# Patient Record
Sex: FEMALE | Race: BLACK OR AFRICAN AMERICAN | NOT HISPANIC OR LATINO | Employment: UNEMPLOYED | URBAN - METROPOLITAN AREA
[De-identification: names, ages, dates, MRNs, and addresses within clinical notes are randomized per-mention and may not be internally consistent; named-entity substitution may affect disease eponyms.]

---

## 2017-04-28 ENCOUNTER — OFFICE VISIT (OUTPATIENT)
Dept: CARDIOLOGY | Facility: CLINIC | Age: 27
End: 2017-04-28

## 2017-04-28 VITALS
BODY MASS INDEX: 31.98 KG/M2 | HEART RATE: 67 BPM | WEIGHT: 199 LBS | DIASTOLIC BLOOD PRESSURE: 78 MMHG | OXYGEN SATURATION: 99 % | SYSTOLIC BLOOD PRESSURE: 128 MMHG | HEIGHT: 66 IN

## 2017-04-28 DIAGNOSIS — R06.02 SHORTNESS OF BREATH: ICD-10-CM

## 2017-04-28 DIAGNOSIS — R00.2 PALPITATIONS: Primary | ICD-10-CM

## 2017-04-28 DIAGNOSIS — I49.3 SYMPTOMATIC PVCS: ICD-10-CM

## 2017-04-28 DIAGNOSIS — R07.2 PRECORDIAL PAIN: ICD-10-CM

## 2017-04-28 PROCEDURE — 99204 OFFICE O/P NEW MOD 45 MIN: CPT | Performed by: INTERNAL MEDICINE

## 2017-04-28 NOTE — PROGRESS NOTES
Date of Office Visit: 2017  Encounter Provider: Shane Hensley MD  Place of Service: [facility] CARDIOLOGY  Patient Name: Nara Jacinto  :1990    Chief complaint: Palpitations, PVC's, SOA, chest pain.    History of Present Illness:    I had the pleasure of seeing your patient in cardiology office on 2017.  As you well   know, she is a very pleasant 27 year-old -American female with a past medical   history significant for hypothyroidism and obstructive sleep apnea who presents for   evaluation.  The patient states that she did have a murmur as a child, and saw Dr. Watts in pediatric cardiology many years ago.  She evidently had an echocardiogram   at one point which showed some regurgitation through one of the valves.  However,   she states that she was told that this had improved at her last visit with him in the   distant past.  Within the last several months, she has experienced significant   palpitations which she described as skipping beats.  She states that when these   occur, she often feels lightheaded, and feels as if she has to take a deep breath.    She also has had some sharp pain in the center of her chest which is fleeting, but   intense when it occurs.  She has also been more short of breath on exertion, and   states moderate activity will cause her to have to stop and rest.  Stairs do pose a   problem for her.  She does have hypothyroidism, and states that her thyroid has   recently been low.  Her Synthroid is currently being adjusted, and she is being   monitored every month with blood work.  She did have a Holter monitor at [facility] in [place], [place] recently.  The results of this are currently pending.    Past Medical History:   Diagnosis Date   • Hyperlipidemia    • Hypothyroidism    • DARIELA (obstructive sleep apnea)     Not on CPAP   • Symptomatic PVCs    • Vitamin D deficiency        Past Surgical History:  "  Procedure Laterality Date   • HERNIA REPAIR     • TONSILLECTOMY         Current Outpatient Prescriptions on File Prior to Visit   Medication Sig Dispense Refill   • atorvastatin (LIPITOR) 80 MG tablet Take 80 mg by mouth Daily.     • cetirizine (zyrTEC) 10 MG tablet Take 10 mg by mouth Daily.     • cholecalciferol (VITAMIN D3) 1000 UNITS tablet Take 1,000 Units by mouth Daily.     • levothyroxine (SYNTHROID, LEVOTHROID) 200 MCG tablet Take 200 mcg by mouth Daily.     • venlafaxine (EFFEXOR) 75 MG tablet Take 75 mg by mouth 2 (Two) Times a Day.       No current facility-administered medications on file prior to visit.      Allergies as of 04/28/2017   • (No Known Allergies)     Social History     Social History   • Marital status: Single     Spouse name: N/A   • Number of children: N/A   • Years of education: N/A     Occupational History   • Not on file.     Social History Main Topics   • Smoking status: Never Smoker   • Smokeless tobacco: Never Used   • Alcohol use No   • Drug use: No   • Sexual activity: Not on file     Other Topics Concern   • Not on file     Social History Narrative     Family History   Problem Relation Age of Onset   • Coronary artery disease Maternal Grandfather      MGF with 3 stents       Review of Systems   Cardiovascular: Positive for chest pain, dyspnea on exertion and palpitations.     Objective:     Vitals:    04/28/17 1000   BP: 128/78   Pulse: 67   SpO2: 99%   Weight: 199 lb (90.3 kg)   Height: 66\" (167.6 cm)     Body mass index is 32.12 kg/(m^2).    Physical Exam   Constitutional: She is oriented to person, place, and time. She appears well-developed and well-nourished.   HENT:   Head: Normocephalic and atraumatic.   Eyes: Conjunctivae are normal.   Neck: Neck supple.   Cardiovascular: Normal rate and regular rhythm.  Exam reveals no gallop and no friction rub.    No murmur heard.  Pulmonary/Chest: Effort normal and breath sounds normal.   Abdominal: Soft. There is no tenderness. "   Musculoskeletal: She exhibits no edema.   Neurological: She is alert and oriented to person, place, and time.   Skin: Skin is warm.   Psychiatric: She has a normal mood and affect. Her behavior is normal.     Lab Review:   Procedures      Assessment:       Diagnosis Plan   1. Palpitations  Adult Transthoracic Echo Complete   2. Symptomatic PVCs  Adult Transthoracic Echo Complete   3. Shortness of breath  Adult Transthoracic Echo Complete   4. Precordial pain  Adult Transthoracic Echo Complete     Plan:       As noted, the patient has been having palpitations, shortness of breath, and atypical   chest pain.  Her EKG from 4/10/2017 did show a PVC, and her symptoms are very   consistent with symptomatic PVCs.  I reassured her that these are fairly benign, but  can cause symptoms.  I am going to obtain the Holter monitor from [facility] in [place], [place], and I will review this.  I feel that she may need a beta   blocker given the significant symptoms that she is experiencing currently.  Also, her   thyroid function may be playing a role in this as she is currently having her Synthroid   adjusted for hypothyroidism.  This is monitored every month.  She did have a murmur   as a child, and was told she had regurgitation throughout valve is well.  I am going to   recheck an echocardiogram at this time.  Further plans will be made pending the   results of the Holter monitor and echo.  I will have her follow-up in one month unless   other issues arise.

## 2017-05-04 ENCOUNTER — TELEPHONE (OUTPATIENT)
Dept: CARDIOLOGY | Facility: CLINIC | Age: 27
End: 2017-05-04

## 2017-05-12 ENCOUNTER — HOSPITAL ENCOUNTER (OUTPATIENT)
Dept: CARDIOLOGY | Facility: HOSPITAL | Age: 27
Discharge: HOME | End: 2017-05-12
Attending: INTERNAL MEDICINE | Admitting: INTERNAL MEDICINE

## 2017-05-12 VITALS
BODY MASS INDEX: 31.98 KG/M2 | DIASTOLIC BLOOD PRESSURE: 66 MMHG | WEIGHT: 199 LBS | SYSTOLIC BLOOD PRESSURE: 110 MMHG | HEIGHT: 66 IN

## 2017-05-12 DIAGNOSIS — R06.02 SHORTNESS OF BREATH: ICD-10-CM

## 2017-05-12 DIAGNOSIS — I49.3 SYMPTOMATIC PVCS: ICD-10-CM

## 2017-05-12 DIAGNOSIS — R00.2 PALPITATIONS: ICD-10-CM

## 2017-05-12 DIAGNOSIS — R07.2 PRECORDIAL PAIN: ICD-10-CM

## 2017-05-12 LAB
ASCENDING AORTA: 2.6 CM
BH CV ECHO MEAS - ACS: 2.3 CM
BH CV ECHO MEAS - AO MAX PG (FULL): 4.8 MMHG
BH CV ECHO MEAS - AO MAX PG: 6.2 MMHG
BH CV ECHO MEAS - AO MEAN PG (FULL): 3 MMHG
BH CV ECHO MEAS - AO MEAN PG: 4 MMHG
BH CV ECHO MEAS - AO ROOT AREA (BSA CORRECTED): 1.3
BH CV ECHO MEAS - AO ROOT AREA: 5.3 CM^2
BH CV ECHO MEAS - AO ROOT DIAM: 2.6 CM
BH CV ECHO MEAS - AO V2 MAX: 124 CM/SEC
BH CV ECHO MEAS - AO V2 MEAN: 86.6 CM/SEC
BH CV ECHO MEAS - AO V2 VTI: 24.6 CM
BH CV ECHO MEAS - AVA(I,A): 1.4 CM^2
BH CV ECHO MEAS - AVA(I,D): 1.4 CM^2
BH CV ECHO MEAS - AVA(V,A): 1.5 CM^2
BH CV ECHO MEAS - AVA(V,D): 1.5 CM^2
BH CV ECHO MEAS - BSA(HAYCOCK): 2.1 M^2
BH CV ECHO MEAS - BSA: 2 M^2
BH CV ECHO MEAS - BZI_BMI: 32.1 KILOGRAMS/M^2
BH CV ECHO MEAS - BZI_METRIC_HEIGHT: 167.6 CM
BH CV ECHO MEAS - BZI_METRIC_WEIGHT: 90.3 KG
BH CV ECHO MEAS - CONTRAST EF (2CH): 50.5 ML/M^2
BH CV ECHO MEAS - CONTRAST EF 4CH: 51.4 ML/M^2
BH CV ECHO MEAS - EDV(MOD-SP2): 105 ML
BH CV ECHO MEAS - EDV(MOD-SP4): 111 ML
BH CV ECHO MEAS - EDV(TEICH): 171.2 ML
BH CV ECHO MEAS - EF(CUBED): 52.5 %
BH CV ECHO MEAS - EF(MOD-SP2): 50.5 %
BH CV ECHO MEAS - EF(MOD-SP4): 51.4 %
BH CV ECHO MEAS - EF(TEICH): 43.7 %
BH CV ECHO MEAS - ESV(MOD-SP2): 52 ML
BH CV ECHO MEAS - ESV(MOD-SP4): 54 ML
BH CV ECHO MEAS - ESV(TEICH): 96.3 ML
BH CV ECHO MEAS - FS: 22 %
BH CV ECHO MEAS - IVS/LVPW: 0.96
BH CV ECHO MEAS - IVSD: 0.88 CM
BH CV ECHO MEAS - LAT PEAK E' VEL: 12 CM/SEC
BH CV ECHO MEAS - LV DIASTOLIC VOL/BSA (35-75): 55.6 ML/M^2
BH CV ECHO MEAS - LV MASS(C)D: 207.3 GRAMS
BH CV ECHO MEAS - LV MASS(C)DI: 103.9 GRAMS/M^2
BH CV ECHO MEAS - LV MAX PG: 1.3 MMHG
BH CV ECHO MEAS - LV MEAN PG: 1 MMHG
BH CV ECHO MEAS - LV SYSTOLIC VOL/BSA (12-30): 27.1 ML/M^2
BH CV ECHO MEAS - LV V1 MAX: 57.9 CM/SEC
BH CV ECHO MEAS - LV V1 MEAN: 34.9 CM/SEC
BH CV ECHO MEAS - LV V1 VTI: 10.6 CM
BH CV ECHO MEAS - LVIDD: 5.9 CM
BH CV ECHO MEAS - LVIDS: 4.6 CM
BH CV ECHO MEAS - LVLD AP2: 6.6 CM
BH CV ECHO MEAS - LVLD AP4: 6.7 CM
BH CV ECHO MEAS - LVLS AP2: 5.6 CM
BH CV ECHO MEAS - LVLS AP4: 5.8 CM
BH CV ECHO MEAS - LVOT AREA (M): 3.1 CM^2
BH CV ECHO MEAS - LVOT AREA: 3.1 CM^2
BH CV ECHO MEAS - LVOT DIAM: 2 CM
BH CV ECHO MEAS - LVPWD: 0.92 CM
BH CV ECHO MEAS - MED PEAK E' VEL: 13 CM/SEC
BH CV ECHO MEAS - MR MAX PG: 54.5 MMHG
BH CV ECHO MEAS - MR MAX VEL: 369 CM/SEC
BH CV ECHO MEAS - MV A DUR: 0.11 SEC
BH CV ECHO MEAS - MV A MAX VEL: 34.1 CM/SEC
BH CV ECHO MEAS - MV DEC SLOPE: 398 CM/SEC^2
BH CV ECHO MEAS - MV DEC TIME: 0.16 SEC
BH CV ECHO MEAS - MV E MAX VEL: 73.1 CM/SEC
BH CV ECHO MEAS - MV E/A: 2.1
BH CV ECHO MEAS - MV MAX PG: 2.3 MMHG
BH CV ECHO MEAS - MV MEAN PG: 1 MMHG
BH CV ECHO MEAS - MV P1/2T MAX VEL: 74 CM/SEC
BH CV ECHO MEAS - MV P1/2T: 54.5 MSEC
BH CV ECHO MEAS - MV V2 MAX: 75.2 CM/SEC
BH CV ECHO MEAS - MV V2 MEAN: 49.4 CM/SEC
BH CV ECHO MEAS - MV V2 VTI: 25.7 CM
BH CV ECHO MEAS - MVA P1/2T LCG: 3 CM^2
BH CV ECHO MEAS - MVA(P1/2T): 4 CM^2
BH CV ECHO MEAS - MVA(VTI): 1.3 CM^2
BH CV ECHO MEAS - PA MAX PG (FULL): 3.2 MMHG
BH CV ECHO MEAS - PA MAX PG: 4.8 MMHG
BH CV ECHO MEAS - PA V2 MAX: 109 CM/SEC
BH CV ECHO MEAS - PULM A REVS DUR: 0.11 SEC
BH CV ECHO MEAS - PULM A REVS VEL: 26.4 CM/SEC
BH CV ECHO MEAS - PULM DIAS VEL: 46.8 CM/SEC
BH CV ECHO MEAS - PULM S/D: 1
BH CV ECHO MEAS - PULM SYS VEL: 47.2 CM/SEC
BH CV ECHO MEAS - PVA(V,A): 1.6 CM^2
BH CV ECHO MEAS - PVA(V,D): 1.6 CM^2
BH CV ECHO MEAS - QP/QS: 1.2
BH CV ECHO MEAS - RAP SYSTOLE: 3 MMHG
BH CV ECHO MEAS - RV MAX PG: 1.6 MMHG
BH CV ECHO MEAS - RV MEAN PG: 1 MMHG
BH CV ECHO MEAS - RV V1 MAX: 63.2 CM/SEC
BH CV ECHO MEAS - RV V1 MEAN: 44.1 CM/SEC
BH CV ECHO MEAS - RV V1 VTI: 14.6 CM
BH CV ECHO MEAS - RVOT AREA: 2.8 CM^2
BH CV ECHO MEAS - RVOT DIAM: 1.9 CM
BH CV ECHO MEAS - RVSP: 18 MMHG
BH CV ECHO MEAS - SI(AO): 65.4 ML/M^2
BH CV ECHO MEAS - SI(CUBED): 53.2 ML/M^2
BH CV ECHO MEAS - SI(LVOT): 16.7 ML/M^2
BH CV ECHO MEAS - SI(MOD-SP2): 26.6 ML/M^2
BH CV ECHO MEAS - SI(MOD-SP4): 28.6 ML/M^2
BH CV ECHO MEAS - SI(TEICH): 37.5 ML/M^2
BH CV ECHO MEAS - SUP REN AO DIAM: 1.5 CM
BH CV ECHO MEAS - SV(AO): 130.6 ML
BH CV ECHO MEAS - SV(CUBED): 106.2 ML
BH CV ECHO MEAS - SV(LVOT): 33.3 ML
BH CV ECHO MEAS - SV(MOD-SP2): 53 ML
BH CV ECHO MEAS - SV(MOD-SP4): 57 ML
BH CV ECHO MEAS - SV(RVOT): 41.4 ML
BH CV ECHO MEAS - SV(TEICH): 74.9 ML
BH CV ECHO MEAS - TAPSE (>1.6): 1.8 CM2
BH CV ECHO MEAS - TR MAX VEL: 194 CM/SEC
BH CV XLRA - RV BASE: 3.2 CM
BH CV XLRA - TDI S': 12 CM/SEC
E/E' RATIO: 6
LEFT ATRIUM VOLUME INDEX: 20 ML/M2
LV EF 2D ECHO EST: 51 %
SINUS: 2.8 CM
STJ: 2.3 CM

## 2017-05-12 PROCEDURE — 93306 TTE W/DOPPLER COMPLETE: CPT | Performed by: INTERNAL MEDICINE

## 2017-05-12 PROCEDURE — 25010000002 PERFLUTREN (DEFINITY) 8.476 MG IN SODIUM CHLORIDE 10 ML INJECTION: Performed by: INTERNAL MEDICINE

## 2017-05-12 PROCEDURE — C8929 TTE W OR WO FOL WCON,DOPPLER: HCPCS

## 2017-05-12 RX ADMIN — PERFLUTREN 1.5 ML: 6.52 INJECTION, SUSPENSION INTRAVENOUS at 11:33

## 2017-06-01 ENCOUNTER — LAB (OUTPATIENT)
Dept: LAB | Facility: HOSPITAL | Age: 27
End: 2017-06-01

## 2017-06-01 ENCOUNTER — OFFICE VISIT (OUTPATIENT)
Dept: CARDIOLOGY | Facility: CLINIC | Age: 27
End: 2017-06-01

## 2017-06-01 VITALS
HEART RATE: 96 BPM | WEIGHT: 200 LBS | OXYGEN SATURATION: 99 % | DIASTOLIC BLOOD PRESSURE: 62 MMHG | SYSTOLIC BLOOD PRESSURE: 102 MMHG | BODY MASS INDEX: 32.14 KG/M2 | HEIGHT: 66 IN

## 2017-06-01 DIAGNOSIS — R00.2 PALPITATIONS: ICD-10-CM

## 2017-06-01 DIAGNOSIS — I49.1 PREMATURE ATRIAL CONTRACTIONS: Primary | ICD-10-CM

## 2017-06-01 DIAGNOSIS — I49.1 PREMATURE ATRIAL CONTRACTIONS: ICD-10-CM

## 2017-06-01 LAB
T-UPTAKE NFR SERPL: 1.29 TBI (ref 0.8–1.3)
T4 SERPL-MCNC: 2.82 MCG/DL (ref 4.5–11.7)
TSH SERPL DL<=0.05 MIU/L-ACNC: 44.9 MIU/ML (ref 0.27–4.2)

## 2017-06-01 PROCEDURE — 84443 ASSAY THYROID STIM HORMONE: CPT

## 2017-06-01 PROCEDURE — 36415 COLL VENOUS BLD VENIPUNCTURE: CPT

## 2017-06-01 PROCEDURE — 84479 ASSAY OF THYROID (T3 OR T4): CPT

## 2017-06-01 PROCEDURE — 99214 OFFICE O/P EST MOD 30 MIN: CPT | Performed by: INTERNAL MEDICINE

## 2017-06-01 PROCEDURE — 84436 ASSAY OF TOTAL THYROXINE: CPT

## 2017-06-07 NOTE — PROGRESS NOTES
Date of Office Visit: 2017  Encounter Provider: Shane Hensley MD  Place of Service: [facility] CARDIOLOGY  Patient Name: Nara Jacinto  :1990    Chief complaint: Follow-up for palpitations and PACs    History of Present Illness:    I again had the pleasure of seeing the patient in cardiology office on 2017.  She is a   very pleasant 27 year-old -American female with a past medical history significant   for hypothyroidism, PACs, and obstructive sleep apnea who presents for follow-up.  The   patient initially saw me on 2017.  She explained that she had a murmur as a child,   and had seen Dr. Watts in pediatric cardiology many years ago.  However, several   months prior to visiting me, she had begun to experience significant palpitations which   she described as skipping beats.  When these occurred, she felt lightheaded, and felt as   if she had to take a deep breath.  She had also been more short of breath with exertion.    She had worn a Holter monitor at [facility] [place] [facility] in [place], [place], on 2017.    She had approximately 7000 PACs noted at that time, but no SVT.  She had only 487   PVCs.  A subsequent echocardiogram was checked in the office on 2017 which   showed an ejection fraction of 51% and mild mitral regurgitation.    The patient presents today for follow-up.  Overall, she feels poorly.  She states that she   is more fatigue, and has started wheezing at night.  She still feels sluggish and short of   breath with exertion.  She is also still feeling the palpitations.    Past Medical History:   Diagnosis Date   • Hyperlipidemia    • Hypothyroidism    • DARIELA (obstructive sleep apnea)     Not on CPAP   • Symptomatic PVCs    • Vitamin D deficiency        Past Surgical History:   Procedure Laterality Date   • HERNIA REPAIR     • TONSILLECTOMY         Current Outpatient Prescriptions on File Prior to Visit   Medication Sig Dispense  "Refill   • atorvastatin (LIPITOR) 80 MG tablet Take 80 mg by mouth Daily.     • cetirizine (zyrTEC) 10 MG tablet Take 10 mg by mouth Daily.     • cholecalciferol (VITAMIN D3) 1000 UNITS tablet Take 1,000 Units by mouth Daily.     • levothyroxine (SYNTHROID, LEVOTHROID) 200 MCG tablet Take 200 mcg by mouth Daily.     • venlafaxine (EFFEXOR) 75 MG tablet Take 75 mg by mouth 2 (Two) Times a Day.       No current facility-administered medications on file prior to visit.      Allergies as of 06/01/2017   • (No Known Allergies)     Social History     Social History   • Marital status: Single     Spouse name: N/A   • Number of children: N/A   • Years of education: N/A     Occupational History   • Not on file.     Social History Main Topics   • Smoking status: Never Smoker   • Smokeless tobacco: Never Used   • Alcohol use No   • Drug use: No   • Sexual activity: Not on file     Other Topics Concern   • Not on file     Social History Narrative     Family History   Problem Relation Age of Onset   • Coronary artery disease Maternal Grandfather      MGF with 3 stents       Review of Systems   Constitution: Positive for weight gain.   HENT: Positive for sore throat.    Cardiovascular: Positive for dyspnea on exertion.   Respiratory: Positive for snoring and wheezing.    Psychiatric/Behavioral: Positive for depression. The patient is nervous/anxious.    All other systems reviewed and are negative.    Objective:     Vitals:    06/01/17 1402   BP: 102/62   Pulse: 96   SpO2: 99%   Weight: 200 lb (90.7 kg)   Height: 66\" (167.6 cm)     Body mass index is 32.28 kg/(m^2).    Physical Exam   Constitutional: She is oriented to person, place, and time. She appears well-developed and well-nourished.   HENT:   Head: Normocephalic and atraumatic.   Eyes: Conjunctivae are normal.   Neck: Neck supple.   Cardiovascular: Normal rate and regular rhythm.  Exam reveals no gallop and no friction rub.    No murmur heard.  Pulmonary/Chest: Effort " normal and breath sounds normal.   Abdominal: Soft. There is no tenderness.   Musculoskeletal: She exhibits no edema.   Neurological: She is alert and oriented to person, place, and time.   Skin: Skin is warm.   Psychiatric: She has a normal mood and affect. Her behavior is normal.     Lab Review:   Procedures    Cardiac Procedures:  1.  Holter monitor on 4/6/2017 at [facility] in [place], [place]: There were   approximately 7000 premature atrial contractions.  There were 487 PVCs.  There was   no SVT or arrhythmias.  2.  Echocardiogram on 5/12/2017: The ejection fraction was 51%.  There was mild   mitral regurgitation.    Assessment:       Diagnosis Plan   1. Premature atrial contractions  Thyroid Panel With TSH   2. Palpitations  Thyroid Panel With TSH     Plan:       I discussed the Holter monitor in detail with the patient.  I had a hard time tracking this   study down, and I have not been able to talk to her yet about it.  I explained to her that   the premature atrial contractions are very likely the cause of her symptoms, and   because she is still having a significant amount with symptoms, I do feel that trying   her on a low-dose medication may be indicated.  She has been wheezing at night,   and I am going to avoid beta blockers for this reason.  I am going to try her on diltiazem   at 30 mg twice a day to see if this helps her symptoms.  She also has multiple other   symptoms, and feels poorly in general.  She is on thyroid replacement therapy, but   has not had her thyroid function tests checked recently.  I am going to check a TSH   and free T4 at this point to see if this may be playing a role in her overall clinical   picture.  I will have her follow-up with me in 2 months, and she will call if other issues   arise.

## 2017-07-12 ENCOUNTER — TRANSCRIBE ORDERS (OUTPATIENT)
Dept: ADMINISTRATIVE | Facility: HOSPITAL | Age: 27
End: 2017-07-12

## 2017-07-12 DIAGNOSIS — J30.2 OTHER SEASONAL ALLERGIC RHINITIS: Primary | ICD-10-CM

## 2017-07-25 ENCOUNTER — OFFICE VISIT (OUTPATIENT)
Dept: ENDOCRINOLOGY | Age: 27
End: 2017-07-25

## 2017-07-25 VITALS
BODY MASS INDEX: 32.02 KG/M2 | WEIGHT: 199.2 LBS | SYSTOLIC BLOOD PRESSURE: 128 MMHG | RESPIRATION RATE: 16 BRPM | HEIGHT: 66 IN | DIASTOLIC BLOOD PRESSURE: 82 MMHG

## 2017-07-25 DIAGNOSIS — E55.9 VITAMIN D DEFICIENCY: ICD-10-CM

## 2017-07-25 DIAGNOSIS — E89.0 POSTABLATIVE HYPOTHYROIDISM: Primary | ICD-10-CM

## 2017-07-25 DIAGNOSIS — E05.00 GRAVES' DISEASE: ICD-10-CM

## 2017-07-25 DIAGNOSIS — I10 ESSENTIAL HYPERTENSION: ICD-10-CM

## 2017-07-25 DIAGNOSIS — E78.5 DYSLIPIDEMIA: ICD-10-CM

## 2017-07-25 PROCEDURE — 99204 OFFICE O/P NEW MOD 45 MIN: CPT | Performed by: INTERNAL MEDICINE

## 2017-07-25 RX ORDER — LEVOTHYROXINE SODIUM 300 UG/1
300 TABLET ORAL DAILY
Qty: 30 TABLET | Refills: 5 | Status: SHIPPED | OUTPATIENT
Start: 2017-07-25 | End: 2017-08-03

## 2017-07-25 NOTE — PROGRESS NOTES
"Subjective   Nara Jacinto is a 27 y.o. female seen as a new patient for hypothyroidism, vit d deficiency, thyroid nodule. Patient denies any problems or concerns. She had thyroid US 04/16/2017 and states that she also had an ablation years ago.     History of Present Illness is a 27-year-old female known patient with history of Graves' disease at age 13.  She was subsequently treated with the radio active iodine ablation and became hypothyroid.  She has been on the variety of doses of parathyroid hormone medication.  On 07/01/2017 she had a thyroid function test that showed her TSH level was 44.9 on 200 µg of the levothyroxin. Since then she has been increased to 250 µg daily.  She still feels very tired.  Also she is intrauterinebirth control for the past 4+ years however recently she had a very painful crampy menstrual bleeding.  /82  Resp 16  Ht 66\" (167.6 cm)  Wt 199 lb 3.2 oz (90.4 kg)  BMI 32.15 kg/m2  No Known Allergies  .  Current Outpatient Prescriptions:   •  atorvastatin (LIPITOR) 80 MG tablet, Take 80 mg by mouth Daily., Disp: , Rfl:   •  cetirizine (zyrTEC) 10 MG tablet, Take 10 mg by mouth Daily., Disp: , Rfl:   •  cholecalciferol (VITAMIN D3) 1000 UNITS tablet, Take 1,000 Units by mouth Daily., Disp: , Rfl:   •  diltiaZEM (CARDIZEM) 30 MG tablet, Take 1 tablet by mouth 2 (Two) Times a Day., Disp: 60 tablet, Rfl: 11  •  levothyroxine (SYNTHROID, LEVOTHROID) 200 MCG tablet, Take 200 mcg by mouth Daily., Disp: , Rfl:   •  venlafaxine (EFFEXOR) 75 MG tablet, Take 75 mg by mouth 2 (Two) Times a Day., Disp: , Rfl:     The following portions of the patient's history were reviewed and updated as appropriate: allergies, current medications, past family history, past medical history, past social history, past surgical history and problem list.    Review of Systems   Constitutional: Negative.    HENT: Negative.    Eyes: Negative.    Respiratory: Negative.    Cardiovascular: Negative.  "   Gastrointestinal: Negative.    Endocrine: Negative.    Genitourinary: Negative.    Musculoskeletal: Negative.    Skin: Negative.    Allergic/Immunologic: Negative.    Neurological: Negative.    Hematological: Negative.    Psychiatric/Behavioral: Negative.        Objective   Physical Exam   Constitutional: She is oriented to person, place, and time. She appears well-developed and well-nourished. No distress.   HENT:   Head: Normocephalic and atraumatic.   Right Ear: External ear normal.   Left Ear: External ear normal.   Nose: Nose normal.   Mouth/Throat: Oropharynx is clear and moist. No oropharyngeal exudate.   Eyes: Conjunctivae and EOM are normal. Pupils are equal, round, and reactive to light. Right eye exhibits no discharge. Left eye exhibits no discharge. No scleral icterus.   Neck: Normal range of motion. Neck supple. No JVD present. No tracheal deviation present. No thyromegaly present.   Cardiovascular: Normal rate, regular rhythm, normal heart sounds and intact distal pulses.  Exam reveals no gallop and no friction rub.    No murmur heard.  Pulmonary/Chest: Effort normal and breath sounds normal. No stridor. No respiratory distress. She has no wheezes. She has no rales. She exhibits no tenderness.   Abdominal: Soft. Bowel sounds are normal. She exhibits no distension and no mass. There is no tenderness. There is no rebound and no guarding. No hernia.   Musculoskeletal: Normal range of motion. She exhibits no edema, tenderness or deformity.   Lymphadenopathy:     She has no cervical adenopathy.   Neurological: She is alert and oriented to person, place, and time. She has normal reflexes. She displays normal reflexes. No cranial nerve deficit. She exhibits normal muscle tone. Coordination normal.   Skin: Skin is warm and dry. No rash noted. She is not diaphoretic. No erythema. No pallor.   Psychiatric: She has a normal mood and affect. Her behavior is normal. Judgment and thought content normal.   Nursing  note and vitals reviewed.        Assessment/Plan   Diagnoses and all orders for this visit:    Postablative hypothyroidism  -     T3, Free  -     T4 & TSH ([facility])  -     T4, Free  -     Thyroglobulin With Anti-TG  -     Uric Acid  -     Vitamin D 25 Hydroxy  -     Comprehensive Metabolic Panel  -     C-Peptide  -     Follicle Stimulating Hormone  -     Hemoglobin A1c  -     Lipid Panel  -     Luteinizing Hormone  -     Prolactin  -     Thyroid Stimulating Immunoglobulin  -     ACTH  -     Cortisol    Graves' disease  -     T3, Free  -     T4 & TSH ([facility])  -     T4, Free  -     Thyroglobulin With Anti-TG  -     Uric Acid  -     Vitamin D 25 Hydroxy  -     Comprehensive Metabolic Panel  -     C-Peptide  -     Follicle Stimulating Hormone  -     Hemoglobin A1c  -     Lipid Panel  -     Luteinizing Hormone  -     Prolactin  -     Thyroid Stimulating Immunoglobulin  -     ACTH  -     Cortisol    Dyslipidemia  -     T3, Free  -     T4 & TSH ([facility])  -     T4, Free  -     Thyroglobulin With Anti-TG  -     Uric Acid  -     Vitamin D 25 Hydroxy  -     Comprehensive Metabolic Panel  -     C-Peptide  -     Follicle Stimulating Hormone  -     Hemoglobin A1c  -     Lipid Panel  -     Luteinizing Hormone  -     Prolactin  -     Thyroid Stimulating Immunoglobulin  -     ACTH  -     Cortisol    Essential hypertension  -     T3, Free  -     T4 & TSH ([facility])  -     T4, Free  -     Thyroglobulin With Anti-TG  -     Uric Acid  -     Vitamin D 25 Hydroxy  -     Comprehensive Metabolic Panel  -     C-Peptide  -     Follicle Stimulating Hormone  -     Hemoglobin A1c  -     Lipid Panel  -     Luteinizing Hormone  -     Prolactin  -     Thyroid Stimulating Immunoglobulin  -     ACTH  -     Cortisol    Vitamin D deficiency  -     T3, Free  -     T4 & TSH ([facility])  -     T4, Free  -     Thyroglobulin With Anti-TG  -     Uric Acid  -     Vitamin D 25 Hydroxy  -     Comprehensive Metabolic Panel  -     C-Peptide  -     Follicle  Stimulating Hormone  -     Hemoglobin A1c  -     Lipid Panel  -     Luteinizing Hormone  -     Prolactin  -     Thyroid Stimulating Immunoglobulin  -     ACTH  -     Cortisol    Other orders  -     UNITHROID 300 MCG tablet; Take 1 tablet by mouth Daily.               His summary I saw and examined this 27-year-old female for above-mentioned problems.  I reviewed her laboratory evaluation as well as office notes from her primary care provider and cardiologist and at this point we will go ahead and order an extensive laboratory evaluation and once the results come back we will go ahead and inform her of any possible modifications.  No wheezing that on 06/01/2017 her TSH was almost 45 on 200 µg of levothyroxin daily I am going to go ahead and increased her dose to 300 µg daily.  She will see Ms. Alanna Bennett in 3 months or sooner if needed with laboratory evaluation prior to each office visit.

## 2017-08-03 LAB
25(OH)D3+25(OH)D2 SERPL-MCNC: 19.1 NG/ML (ref 30–100)
ACTH PLAS-MCNC: 16.3 PG/ML (ref 7.2–63.3)
ALBUMIN SERPL-MCNC: 4.7 G/DL (ref 3.5–5.2)
ALBUMIN/GLOB SERPL: 1.5 G/DL
ALP SERPL-CCNC: 74 U/L (ref 39–117)
ALT SERPL-CCNC: 26 U/L (ref 1–33)
AST SERPL-CCNC: 20 U/L (ref 1–32)
BILIRUB SERPL-MCNC: 0.6 MG/DL (ref 0.1–1.2)
BUN SERPL-MCNC: 8 MG/DL (ref 6–20)
BUN/CREAT SERPL: 6.8 (ref 7–25)
C PEPTIDE SERPL-MCNC: 2.6 NG/ML (ref 1.1–4.4)
CALCIUM SERPL-MCNC: 9.7 MG/DL (ref 8.6–10.5)
CHLORIDE SERPL-SCNC: 102 MMOL/L (ref 98–107)
CHOLEST SERPL-MCNC: 232 MG/DL (ref 0–200)
CO2 SERPL-SCNC: 24 MMOL/L (ref 22–29)
CORTIS SERPL-MCNC: 4.3 UG/DL
CREAT SERPL-MCNC: 1.18 MG/DL (ref 0.57–1)
FSH SERPL-ACNC: 5.8 MIU/ML
GLOBULIN SER CALC-MCNC: 3.2 GM/DL
GLUCOSE SERPL-MCNC: 87 MG/DL (ref 65–99)
HBA1C MFR BLD: 5.6 % (ref 4.8–5.6)
HDLC SERPL-MCNC: 36 MG/DL (ref 40–60)
LDLC SERPL CALC-MCNC: 167 MG/DL (ref 0–100)
LH SERPL-ACNC: 7.8 MIU/ML
POTASSIUM SERPL-SCNC: 4.4 MMOL/L (ref 3.5–5.2)
PROLACTIN SERPL-MCNC: 16.8 NG/ML (ref 4.8–23.3)
PROT SERPL-MCNC: 7.9 G/DL (ref 6–8.5)
SODIUM SERPL-SCNC: 139 MMOL/L (ref 136–145)
T3FREE SERPL-MCNC: 2 PG/ML (ref 2–4.4)
T4 FREE SERPL-MCNC: 0.65 NG/DL (ref 0.93–1.7)
T4 SERPL-MCNC: 4.24 MCG/DL (ref 4.5–11.7)
THYROGLOB AB SERPL-ACNC: <1 IU/ML (ref 0–0.9)
THYROGLOB SERPL-MCNC: 9.5 NG/ML (ref 1.5–38.5)
TRIGL SERPL-MCNC: 143 MG/DL (ref 0–150)
TSH SERPL DL<=0.005 MIU/L-ACNC: 40.17 MIU/ML (ref 0.27–4.2)
TSI ACT/NOR SER: 90 % (ref 0–139)
URATE SERPL-MCNC: 5 MG/DL (ref 2.4–5.7)
VLDLC SERPL CALC-MCNC: 28.6 MG/DL (ref 5–40)

## 2017-08-03 RX ORDER — LEVOTHYROXINE SODIUM 200 UG/1
400 TABLET ORAL DAILY
Qty: 60 TABLET | Refills: 5 | Status: SHIPPED | OUTPATIENT
Start: 2017-08-03 | End: 2017-08-04 | Stop reason: SDUPTHER

## 2017-08-03 RX ORDER — ERGOCALCIFEROL 1.25 MG/1
50000 CAPSULE ORAL 2 TIMES WEEKLY
Qty: 26 CAPSULE | Refills: 3 | Status: SHIPPED | OUTPATIENT
Start: 2017-08-03 | End: 2017-08-04 | Stop reason: SDUPTHER

## 2017-08-04 RX ORDER — ERGOCALCIFEROL 1.25 MG/1
50000 CAPSULE ORAL 2 TIMES WEEKLY
Qty: 26 CAPSULE | Refills: 3 | Status: SHIPPED | OUTPATIENT
Start: 2017-08-07 | End: 2018-08-07

## 2017-08-04 RX ORDER — LEVOTHYROXINE SODIUM 200 UG/1
400 TABLET ORAL DAILY
Qty: 180 TABLET | Refills: 3 | Status: SHIPPED | OUTPATIENT
Start: 2017-08-04 | End: 2020-05-15

## 2017-09-06 ENCOUNTER — OFFICE VISIT (OUTPATIENT)
Dept: CARDIOLOGY | Facility: CLINIC | Age: 27
End: 2017-09-06

## 2017-09-06 VITALS
SYSTOLIC BLOOD PRESSURE: 110 MMHG | WEIGHT: 205 LBS | OXYGEN SATURATION: 98 % | BODY MASS INDEX: 32.95 KG/M2 | HEART RATE: 88 BPM | DIASTOLIC BLOOD PRESSURE: 62 MMHG | HEIGHT: 66 IN

## 2017-09-06 DIAGNOSIS — I49.1 PREMATURE ATRIAL CONTRACTIONS: Primary | ICD-10-CM

## 2017-09-06 DIAGNOSIS — R00.2 PALPITATIONS: ICD-10-CM

## 2017-09-06 PROCEDURE — 99213 OFFICE O/P EST LOW 20 MIN: CPT | Performed by: INTERNAL MEDICINE

## 2017-09-06 NOTE — PROGRESS NOTES
Date of Office Visit: 2017  Encounter Provider: Shane Hensley MD  Place of Service: [facility] CARDIOLOGY  Patient Name: Nara Jacinto  :1990     Chief complaint: Follow-up for palpitations and PACs.    History of Present Illness:    I again had the pleasure of seeing the patient in cardiology office on 2017. She   is a very pleasant 27 year-old -American female with a past medical history   significant for hypothyroidism, PACs, and obstructive sleep apnea who presents   for follow-up.  The patient initially saw me on 2017.  She explained that she   had a murmur as a child, and had seen Dr. Watts in pediatric cardiology many   years ago.  However, several months prior to visiting me, she had begun to   experience significant palpitations which she described as skipping beats. When   these occurred, she felt lightheaded, and felt as if she had to take a deep breath.    She had also been more short of breath with exertion.  She had worn a Holter   monitor at [facility] in [place], [place], on 2017.  She had   approximately 7000 PACs noted at that time, but no SVT.  She had only 487   PVCs. A subsequent echocardiogram was checked in the office on 2017   which showed an ejection fraction of 51% and mild mitral regurgitation.  She did   have her thyroid function tests checked, and was found to be significantly   hypothyroid with a TSH of 44.9 on 2017.  She subsequently was referred to   Dr. Villafuerte, and was started on thyroid repletion.     The patient presents today for follow-up.  She is doing well.  In fact, she states   that the palpitations have improved significantly.  She only has occasional bursts   of palpitations, but much less frequently than before.  She has not had any   chest pain or shortness of breath.  She does feel better since her thyroid has   been treated.    Past Medical History:   Diagnosis Date   •  Hyperlipidemia    • Hypothyroidism    • DARIELA (obstructive sleep apnea)     Not on CPAP   • Premature atrial contractions     7000 PAC's by Holter on 4/6/17   • PVC's (premature ventricular contractions)    • Thyroid nodule    • Vitamin D deficiency        Past Surgical History:   Procedure Laterality Date   • HERNIA REPAIR     • TONSILLECTOMY         Current Outpatient Prescriptions on File Prior to Visit   Medication Sig Dispense Refill   • atorvastatin (LIPITOR) 80 MG tablet Take 80 mg by mouth Daily.     • cetirizine (zyrTEC) 10 MG tablet Take 10 mg by mouth Daily.     • cholecalciferol (VITAMIN D3) 1000 UNITS tablet Take 1,000 Units by mouth Daily.     • diltiaZEM (CARDIZEM) 30 MG tablet Take 1 tablet by mouth 2 (Two) Times a Day. 60 tablet 11   • ergocalciferol (DRISDOL) 84439 UNITS capsule Take 1 capsule by mouth 2 (Two) Times a Week. 26 capsule 3   • UNITHROID 200 MCG tablet Take 2 tablets by mouth Daily. 180 tablet 3   • venlafaxine (EFFEXOR) 75 MG tablet Take 75 mg by mouth 2 (Two) Times a Day.       No current facility-administered medications on file prior to visit.      Allergies as of 09/06/2017   • (No Known Allergies)     Social History     Social History   • Marital status: Single     Spouse name: N/A   • Number of children: N/A   • Years of education: N/A     Occupational History   • Not on file.     Social History Main Topics   • Smoking status: Never Smoker   • Smokeless tobacco: Never Used   • Alcohol use No   • Drug use: No   • Sexual activity: Not on file     Other Topics Concern   • Not on file     Social History Narrative     Family History   Problem Relation Age of Onset   • Coronary artery disease Maternal Grandfather      MGF with 3 stents       Review of Systems   Constitution: Positive for weight gain.   Cardiovascular: Positive for palpitations.   Musculoskeletal: Positive for joint swelling.   Neurological: Positive for numbness.   Psychiatric/Behavioral: The patient is nervous/anxious.  "   All other systems reviewed and are negative.    Objective:     Vitals:    09/06/17 1427   BP: 110/62   Pulse: 88   SpO2: 98%   Weight: 205 lb (93 kg)   Height: 66\" (167.6 cm)     Body mass index is 33.09 kg/(m^2).    Physical Exam   Constitutional: She is oriented to person, place, and time. She appears well-developed and well-nourished.   HENT:   Head: Normocephalic and atraumatic.   Eyes: Conjunctivae are normal.   Neck: Neck supple.   Cardiovascular: Normal rate and regular rhythm.  Exam reveals no gallop and no friction rub.    No murmur heard.  Pulmonary/Chest: Effort normal and breath sounds normal.   Abdominal: Soft. There is no tenderness.   Musculoskeletal: She exhibits no edema.   Neurological: She is alert and oriented to person, place, and time.   Skin: Skin is warm.   Psychiatric: She has a normal mood and affect. Her behavior is normal.     Lab Review:   Procedures    Cardiac Procedures:  1.  Holter monitor on 4/6/2017 at [facility] in [place], [place]: There were   approximately 7000 premature atrial contractions.  There were 487 PVCs.  There was   no SVT or arrhythmias.  2.  Echocardiogram on 5/12/2017: The ejection fraction was 51%.  There was mild   mitral regurgitation.    Assessment:       Diagnosis Plan   1. Premature atrial contractions     2. Palpitations       Plan:       The patient seems to be doing very well at this point.  I strongly suspect that her   hypothyroidism was driving her premature atrial contractions to be much more   frequent.  She has had only rare palpitations recently, and feels much better in   general since her thyroid has been treated.  She is going to follow-up with Dr. Villafuerte for this.  I feel that at this point she could come off of the diltiazem.  She  is on a very minimal dose.  She will start taking 30 mg once a day over the next   several days, and then quit.  If the palpitations recur, this can always be restarted.    Otherwise, I will plan on " following her on a yearly basis.

## 2018-08-30 ENCOUNTER — TRANSCRIBE ORDERS (OUTPATIENT)
Dept: ADMINISTRATIVE | Facility: HOSPITAL | Age: 28
End: 2018-08-30

## 2018-08-30 DIAGNOSIS — N62 HYPERTROPHY OF BREAST: ICD-10-CM

## 2018-08-30 DIAGNOSIS — N62 LARGE BREASTS: Primary | ICD-10-CM

## 2018-09-11 ENCOUNTER — APPOINTMENT (OUTPATIENT)
Dept: ULTRASOUND IMAGING | Facility: HOSPITAL | Age: 28
End: 2018-09-11

## 2018-09-11 ENCOUNTER — HOSPITAL ENCOUNTER (OUTPATIENT)
Dept: MAMMOGRAPHY | Facility: HOSPITAL | Age: 28
Discharge: HOME | End: 2018-09-11
Admitting: NURSE PRACTITIONER

## 2018-09-11 DIAGNOSIS — N62 HYPERTROPHY OF BREAST: ICD-10-CM

## 2018-09-11 PROCEDURE — 77066 DX MAMMO INCL CAD BI: CPT

## 2019-11-25 ENCOUNTER — OFFICE VISIT (OUTPATIENT)
Dept: OBSTETRICS AND GYNECOLOGY | Age: 29
End: 2019-11-25

## 2019-11-25 VITALS
HEIGHT: 66 IN | WEIGHT: 201 LBS | DIASTOLIC BLOOD PRESSURE: 78 MMHG | BODY MASS INDEX: 32.3 KG/M2 | SYSTOLIC BLOOD PRESSURE: 120 MMHG

## 2019-11-25 DIAGNOSIS — Z01.419 WELL WOMAN EXAM WITH ROUTINE GYNECOLOGICAL EXAM: Primary | ICD-10-CM

## 2019-11-25 PROBLEM — L73.2 HYDRADENITIS: Status: ACTIVE | Noted: 2019-11-25

## 2019-11-25 PROCEDURE — 99385 PREV VISIT NEW AGE 18-39: CPT | Performed by: NURSE PRACTITIONER

## 2019-11-25 RX ORDER — CEPHALEXIN 500 MG/1
CAPSULE ORAL
COMMUNITY
Start: 2019-11-15 | End: 2020-05-15

## 2019-11-25 RX ORDER — IBUPROFEN 800 MG/1
TABLET ORAL
COMMUNITY
Start: 2019-11-15

## 2019-11-25 RX ORDER — GABAPENTIN 300 MG/1
CAPSULE ORAL
COMMUNITY
End: 2020-05-15

## 2019-11-25 RX ORDER — PROMETHAZINE HYDROCHLORIDE 25 MG/1
TABLET ORAL
COMMUNITY
End: 2020-05-15

## 2019-11-25 RX ORDER — VENLAFAXINE HYDROCHLORIDE 150 MG/1
CAPSULE, EXTENDED RELEASE ORAL
COMMUNITY
Start: 2019-11-12 | End: 2020-05-15

## 2019-11-25 RX ORDER — DOXYCYCLINE HYCLATE 100 MG/1
CAPSULE ORAL
COMMUNITY
End: 2020-05-15

## 2019-11-25 RX ORDER — UBIDECARENONE 100 MG
CAPSULE ORAL
COMMUNITY
Start: 2019-11-12

## 2019-11-25 RX ORDER — METHOCARBAMOL 750 MG/1
TABLET, FILM COATED ORAL
COMMUNITY

## 2019-11-25 RX ORDER — PYRAZINAMIDE 500 MG/1
TABLET ORAL
COMMUNITY
End: 2020-05-15

## 2019-11-25 RX ORDER — LEVOTHYROXINE SODIUM 175 UG/1
TABLET ORAL
COMMUNITY
Start: 2019-11-12

## 2019-11-25 RX ORDER — ACETAMINOPHEN 650 MG/1
TABLET, FILM COATED, EXTENDED RELEASE ORAL
COMMUNITY
Start: 2019-11-13

## 2019-11-25 RX ORDER — LORATADINE 10 MG/1
TABLET ORAL
COMMUNITY
Start: 2019-11-12 | End: 2020-05-15

## 2019-11-25 RX ORDER — HYDROCODONE BITARTRATE AND ACETAMINOPHEN 7.5; 325 MG/1; MG/1
TABLET ORAL
COMMUNITY
End: 2020-05-15

## 2019-11-25 RX ORDER — ERGOCALCIFEROL 1.25 MG/1
CAPSULE ORAL
COMMUNITY

## 2019-11-25 RX ORDER — DICYCLOMINE HCL 20 MG
TABLET ORAL
COMMUNITY

## 2019-11-25 RX ORDER — METRONIDAZOLE 500 MG/1
TABLET ORAL
COMMUNITY
End: 2020-05-15

## 2019-11-25 RX ORDER — METHYLPREDNISOLONE 4 MG/1
TABLET ORAL
COMMUNITY
End: 2020-05-15

## 2019-11-27 LAB
C TRACH RRNA SPEC QL NAA+PROBE: NEGATIVE
N GONORRHOEA RRNA SPEC QL NAA+PROBE: NEGATIVE
T VAGINALIS DNA SPEC QL NAA+PROBE: NEGATIVE

## 2019-11-29 LAB
CONV .: NORMAL
CYTOLOGIST CVX/VAG CYTO: NORMAL
CYTOLOGY CVX/VAG DOC CYTO: NORMAL
CYTOLOGY CVX/VAG DOC THIN PREP: NORMAL
DX ICD CODE: NORMAL
HIV 1 & 2 AB SER-IMP: NORMAL
OTHER STN SPEC: NORMAL
PATHOLOGIST CVX/VAG CYTO: NORMAL
STAT OF ADQ CVX/VAG CYTO-IMP: NORMAL

## 2019-12-04 ENCOUNTER — TELEPHONE (OUTPATIENT)
Dept: OBSTETRICS AND GYNECOLOGY | Age: 29
End: 2019-12-04

## 2019-12-04 NOTE — TELEPHONE ENCOUNTER
----- Message from EN Hua sent at 12/4/2019  8:26 AM EST -----  Please inform patient her pap smear returned negative (normal). Thank you.

## 2022-05-04 ENCOUNTER — OFFICE VISIT (OUTPATIENT)
Dept: OBSTETRICS AND GYNECOLOGY | Age: 32
End: 2022-05-04

## 2022-05-04 VITALS
SYSTOLIC BLOOD PRESSURE: 110 MMHG | WEIGHT: 198.2 LBS | HEIGHT: 65 IN | DIASTOLIC BLOOD PRESSURE: 72 MMHG | BODY MASS INDEX: 33.02 KG/M2

## 2022-05-04 DIAGNOSIS — Z01.419 WELL WOMAN EXAM WITH ROUTINE GYNECOLOGICAL EXAM: Primary | ICD-10-CM

## 2022-05-04 PROBLEM — E66.9 OBESITY: Status: ACTIVE | Noted: 2022-05-04

## 2022-05-04 PROBLEM — G56.21 CUBITAL TUNNEL SYNDROME ON RIGHT: Status: ACTIVE | Noted: 2022-01-10

## 2022-05-04 PROBLEM — F41.9 ANXIETY: Status: ACTIVE | Noted: 2022-05-04

## 2022-05-04 PROBLEM — F32.A DEPRESSIVE DISORDER: Status: ACTIVE | Noted: 2022-05-04

## 2022-05-04 PROBLEM — G56.03 BILATERAL CARPAL TUNNEL SYNDROME: Status: ACTIVE | Noted: 2022-01-10

## 2022-05-04 PROCEDURE — 99395 PREV VISIT EST AGE 18-39: CPT | Performed by: NURSE PRACTITIONER

## 2022-05-04 PROCEDURE — 3008F BODY MASS INDEX DOCD: CPT | Performed by: NURSE PRACTITIONER

## 2022-05-04 RX ORDER — LEVOCETIRIZINE DIHYDROCHLORIDE 5 MG/1
TABLET, FILM COATED ORAL
COMMUNITY

## 2022-05-04 RX ORDER — AZELASTINE HYDROCHLORIDE 0.5 MG/ML
SOLUTION/ DROPS OPHTHALMIC
COMMUNITY

## 2022-05-04 RX ORDER — FLUTICASONE PROPIONATE 50 MCG
SPRAY, SUSPENSION (ML) NASAL
COMMUNITY

## 2022-05-04 RX ORDER — NYSTATIN 100000 U/G
CREAM TOPICAL
COMMUNITY

## 2022-05-04 RX ORDER — DOXYCYCLINE 100 MG/1
TABLET ORAL
COMMUNITY
Start: 2022-03-17

## 2022-05-04 RX ORDER — AMLODIPINE BESYLATE 2.5 MG/1
TABLET ORAL
COMMUNITY

## 2022-05-04 RX ORDER — CITALOPRAM 20 MG/1
TABLET ORAL
COMMUNITY

## 2022-05-04 RX ORDER — CLINDAMYCIN PHOSPHATE 10 MG/G
GEL TOPICAL
COMMUNITY

## 2022-05-04 RX ORDER — ROSUVASTATIN CALCIUM 40 MG/1
TABLET, COATED ORAL
COMMUNITY

## 2022-05-04 RX ORDER — SULFAMETHOXAZOLE AND TRIMETHOPRIM 800; 160 MG/1; MG/1
TABLET ORAL
COMMUNITY

## 2022-05-04 NOTE — PROGRESS NOTES
[facility]  Routine Annual Visit    2022    Patient: Nara Jacinto          MR#:[id number]      History of Present Illness    32 y.o. female  who presents for annual exam with request for new IUD    She has done with mirena and is requesting a new device for contraception    No complaints today. Sees PCP regularly.    Patient's last menstrual period was 2022 (approximate).  Obstetric History:  OB History        1    Para   1    Term   1            AB        Living   1       SAB        IAB        Ectopic        Molar        Multiple        Live Births   1               Menstrual History:     Patient's last menstrual period was 2022 (approximate).       Sexual History:       ________________________________________  Patient Active Problem List   Diagnosis   • Vitamin D deficiency   • Essential hypertension   • Graves' disease   • Dyslipidemia   • Postablative hypothyroidism   • Hydradenitis   • Anxiety   • Bilateral carpal tunnel syndrome   • Cubital tunnel syndrome on right   • Depressive disorder   • Obesity       Past Medical History:   Diagnosis Date   • Hyperlipidemia    • Hypothyroidism    • DARIELA (obstructive sleep apnea)     Not on CPAP   • Premature atrial contractions     7000 PAC's by Holter on 17   • PVC's (premature ventricular contractions)    • Thyroid nodule    • Vitamin D deficiency        Past Surgical History:   Procedure Laterality Date   • HERNIA REPAIR  2008   • REDUCTION MAMMAPLASTY  2018   • TONSILLECTOMY         Social History     Tobacco Use   Smoking Status Never Smoker   Smokeless Tobacco Never Used       Family History   Problem Relation Age of Onset   • Coronary artery disease Maternal Grandfather         MGF with 3 stents   • Thyroid disease Mother    • Hyperlipidemia Father        Prior to Admission medications    Medication Sig Start Date End Date Taking? Authorizing Provider   amLODIPine (NORVASC) 2.5 MG tablet  amlodipine 2.5 mg tablet   Take 1 tablet every day by oral route.   Yes Provider, MD Ange   azelastine (OPTIVAR) 0.05 % ophthalmic solution azelastine 0.05 % eye drops   INSTILL 1 DROP INTO AFFECTED EYE(S) BY OPHTHALMIC ROUTE 2 TIMES PER DAY for 7 days   Yes Provider, MD Ange   cholecalciferol (VITAMIN D3) 1000 UNITS tablet Take 1,000 Units by mouth Daily.   Yes Provider, MD Ange   citalopram (CeleXA) 20 MG tablet citalopram 20 mg tablet   Take 1 tablet every day by oral route.   Yes Provider, MD Ange   clindamycin (CLINDAGEL) 1 % gel clindamycin 1 % topical gel   Yes Provider, MD Ange   doxycycline (ADOXA) 100 MG tablet  3/17/22  Yes Provider, MD Ange   fluticasone (FLONASE) 50 MCG/ACT nasal spray fluticasone propionate 50 mcg/actuation nasal spray,suspension   Spray 1 spray every day by intranasal route.   Yes Provider, MD Ange   ibuprofen (ADVIL,MOTRIN) 800 MG tablet  11/15/19  Yes Provider, MD Ange   levocetirizine (XYZAL) 5 MG tablet levocetirizine 5 mg tablet   Take 1 tablet every day by oral route.   Yes Provider, MD Ange   levothyroxine (SYNTHROID, LEVOTHROID) 175 MCG tablet  11/12/19  Yes Provider, MD Ange   methocarbamol (ROBAXIN) 750 MG tablet methocarbamol 750 mg tablet   Yes Provider, MD Ange   mupirocin (BACTROBAN) 2 % ointment mupirocin 2 % topical ointment   Yes Provider, MD Ange   nystatin (MYCOSTATIN) 610403 UNIT/GM cream nystatin 100,000 unit/gram topical cream   Yes Provider, MD Ange   rosuvastatin (CRESTOR) 40 MG tablet rosuvastatin 40 mg tablet   Take 1 tablet every day by oral route.   Yes Provider, MD Ange   sulfamethoxazole-trimethoprim (BACTRIM DS,SEPTRA DS) 800-160 MG per tablet sulfamethoxazole 800 mg-trimethoprim 160 mg tablet   Take 1 tablet every 12 hours by oral route for 10 days.   Yes Provider, MD Ange   vitamin D (ERGOCALCIFEROL) 1.25 MG (56437 UT) capsule capsule ergocalciferol  "(vitamin D2) 50,000 unit capsule   Yes Provider, MD Ange   amoxicillin-clavulanate (Augmentin) 875-125 MG per tablet Take 1 tablet by mouth 2 (Two) Times a Day. 5/15/20   Mary Kern APRN   atorvastatin (LIPITOR) 80 MG tablet Take 80 mg by mouth Daily.    Provider, MD Ange   D3-1000 25 MCG (1000 UT) capsule  11/12/19   ProviderAnge MD   dicyclomine (BENTYL) 20 MG tablet dicyclomine 20 mg tablet    Provider, MD Ange   MAPAP ARTHRITIS PAIN 650 MG 8 hr tablet  11/13/19   ProviderAnge MD     ________________________________________    The following portions of the patient's history were reviewed and updated as appropriate: allergies, current medications, past family history, past medical history, past social history, past surgical history and problem list.    Review of Systems   Constitutional: Negative.    HENT: Negative.    Eyes: Negative for visual disturbance.   Respiratory: Negative for cough, shortness of breath and wheezing.    Cardiovascular: Negative for chest pain, palpitations and leg swelling.   Gastrointestinal: Negative for abdominal distention, abdominal pain, blood in stool, constipation, diarrhea, nausea and vomiting.   Endocrine: Negative for cold intolerance and heat intolerance.   Genitourinary: Negative for difficulty urinating, dyspareunia, dysuria, frequency, genital sores, hematuria, menstrual problem, pelvic pain, urgency, vaginal bleeding, vaginal discharge and vaginal pain.   Musculoskeletal: Negative.    Skin: Negative.    Neurological: Negative for dizziness, weakness, light-headedness, numbness and headaches.   Hematological: Negative.    Psychiatric/Behavioral: Negative.    Breasts: negative for lumps skin changes, dimpling, swelling, nipple changes/discharge bilaterally       Objective   Physical Exam    /72   Ht 165.1 cm (65\")   Wt 89.9 kg (198 lb 3.2 oz)   LMP 04/20/2022 (Approximate)   Breastfeeding No   BMI 32.98 kg/m²    BP " "Readings from Last 3 Encounters:   05/04/22 110/72   05/15/20 137/85   11/25/19 120/78      Wt Readings from Last 3 Encounters:   05/04/22 89.9 kg (198 lb 3.2 oz)   05/15/20 88.9 kg (196 lb)   11/25/19 91.2 kg (201 lb)        BMI: Estimated body mass index is 32.98 kg/m² as calculated from the following:    Height as of this encounter: 165.1 cm (65\").    Weight as of this encounter: 89.9 kg (198 lb 3.2 oz).            General:   alert, appears stated age and cooperative   Heart: regular rate and rhythm, S1, S2 normal, no murmur, click, rub or gallop   Lungs: clear to auscultation bilaterally   Abdomen: soft, non-tender, without masses or organomegaly   Breast: inspection negative, no nipple discharge or bleeding, no masses or nodularity palpable   Vulva: External genitalia including bartholin's glands, Urethra, [place] Woods's gland and urethra meatus are normal, Perineum, rectum and anus appear normal  and Bladder appears normal without significant prolapse    Vagina: normal mucosa, normal discharge   Cervix: no cervical motion tenderness, no lesions and IUD strings are visible     Uterus: normal size, mobile, non-tender and normal shape and consistency   Adnexa: no mass, fullness, tenderness     Assessment:    Diagnoses and all orders for this visit:    1. Well woman exam with routine gynecological exam (Primary)  -     IgP, Aptima HPV  -     NuSwab VG+ - Swab, Vagina        Return for IUD replacement    Healthy lifestyle modifications discussed, counseled on self breast exams and bone health      All of the patient's questions were addressed and answered, I have encouraged her to call for today's test results if she has not received them within 10 days.  Patient is advised to call with any change in her condition or with any other questions, otherwise return in 12 months for annual examination.      Kalee Alcantar, APRN  5/4/2022 13:30 EDT  "

## 2022-05-06 LAB
A VAGINAE DNA VAG QL NAA+PROBE: NORMAL SCORE
BVAB2 DNA VAG QL NAA+PROBE: NORMAL SCORE
C ALBICANS DNA VAG QL NAA+PROBE: NEGATIVE
C GLABRATA DNA VAG QL NAA+PROBE: NEGATIVE
C TRACH DNA VAG QL NAA+PROBE: NEGATIVE
MEGA1 DNA VAG QL NAA+PROBE: NORMAL SCORE
N GONORRHOEA DNA VAG QL NAA+PROBE: NEGATIVE
T VAGINALIS DNA VAG QL NAA+PROBE: NEGATIVE

## 2022-05-10 LAB
CYTOLOGIST CVX/VAG CYTO: ABNORMAL
CYTOLOGY CVX/VAG DOC CYTO: ABNORMAL
CYTOLOGY CVX/VAG DOC THIN PREP: ABNORMAL
DX ICD CODE: ABNORMAL
DX ICD CODE: ABNORMAL
HIV 1 & 2 AB SER-IMP: ABNORMAL
HPV I/H RISK 4 DNA CVX QL PROBE+SIG AMP: NEGATIVE
OTHER STN SPEC: ABNORMAL
PATHOLOGIST CVX/VAG CYTO: ABNORMAL
RECOM F/U CVX/VAG CYTO: ABNORMAL
STAT OF ADQ CVX/VAG CYTO-IMP: ABNORMAL

## 2022-05-16 ENCOUNTER — OFFICE VISIT (OUTPATIENT)
Dept: OBSTETRICS AND GYNECOLOGY | Age: 32
End: 2022-05-16

## 2022-05-16 VITALS
DIASTOLIC BLOOD PRESSURE: 72 MMHG | WEIGHT: 198.4 LBS | SYSTOLIC BLOOD PRESSURE: 132 MMHG | BODY MASS INDEX: 33.05 KG/M2 | HEIGHT: 65 IN

## 2022-05-16 DIAGNOSIS — Z30.430 ENCOUNTER FOR IUD INSERTION: ICD-10-CM

## 2022-05-16 DIAGNOSIS — Z30.432 ENCOUNTER FOR IUD REMOVAL: ICD-10-CM

## 2022-05-16 PROBLEM — Z97.5 IUD (INTRAUTERINE DEVICE) IN PLACE: Status: ACTIVE | Noted: 2022-05-16

## 2022-05-16 PROBLEM — R87.610 ASCUS OF CERVIX WITH NEGATIVE HIGH RISK HPV: Status: ACTIVE | Noted: 2022-05-16

## 2022-05-16 LAB
B-HCG UR QL: NEGATIVE
EXPIRATION DATE: NORMAL
INTERNAL NEGATIVE CONTROL: NORMAL
INTERNAL POSITIVE CONTROL: NORMAL
Lab: NORMAL

## 2022-05-16 PROCEDURE — 58301 REMOVE INTRAUTERINE DEVICE: CPT | Performed by: NURSE PRACTITIONER

## 2022-05-16 PROCEDURE — 58300 INSERT INTRAUTERINE DEVICE: CPT | Performed by: NURSE PRACTITIONER

## 2022-05-16 PROCEDURE — 81025 URINE PREGNANCY TEST: CPT | Performed by: NURSE PRACTITIONER

## 2022-05-16 NOTE — PROGRESS NOTES
Please inform patient:  Your recent pap smear showed mild inflammatory changes only.   These changes occur as a result of mild inflammation in the vagina  When the pap smear shows inflammation, we automatically check for the HPV virus.   This test for HPV returned negative.  It is safe to repeat your pap smear in 1 year.

## 2022-05-16 NOTE — PROGRESS NOTES
IUD Removal and Insertion Procedure Note    IUD Removal    Type of IUD:  Mirena  Date of insertion:  known  Reason for removal:  Device expiration  Other relevant history/information:  none    Procedure Time Documentation  The risks of the procedure were reviewed with the patient including bleeding, infection and unlikely damage to the uterus and the benefits of the procedure were explained to the patient and Written informed consent was obtained    Procedure Details  IUD strings visible:  yes  Local anesthesia:  None  Tenaculum used:  Yes, single tooth  Removal:  IUD strings grasped and IUD removed intact with gentle traction.  The patient tolerated the procedure well.    IUD Insertion    Indication: Desires long acting reversible contraception     Procedure Details   Urine pregnancy test was done and was NEGATIVE .  The risks (including infection, bleeding, pain, and uterine perforation) and benefits of the procedure were explained to the patient and Written informed consent was obtained.      Cervix cleansed with Betadine. Uterus sounded to 7 cm. IUD inserted without difficulty. String visible and trimmed.    IUD Information:  Mirena.    Condition:  Stable    Complications:  None noted and Patient tolerated the procedure well without complications.    Plan:  The patient was advised to call for any fever or for prolonged or severe pain or bleeding. She was advised to use NSAID as needed for mild to moderate pain.         EN Culver  5/16/2022 10:02 EDT

## 2024-03-08 NOTE — PAT
Patient notified of EKG order for procedure. Pt states there is a [facility] near her that she can receive EKG.

## 2024-03-08 NOTE — PAT
PAT call complete. Education provided to the patient on the following:    - Nothing to eat after midnight the night before your procedure, water and black coffee okay up to 2 hours before arrival time.  - If diabetic and procedure is after noon: No food 8 hours prior to arrival time, and only then only clear liquids 2 hours before arrival time.   - You will need to have someone drive you home after your procedure and remain with you for 24 hours after. The  will need to remain on site during your visit.  - Please remove all jewelry, including body piercing's, and leave any valuables at home. Only bring your drivers license and insurance card on day of procedure.  - Please arrive with a full bladder to provide a pregnancy test.   - wear glasses and bring your case.  - Wash with antibacterial soap (such as Dial) the night before and morning of procedure.  - Be prepared to provide your last dose of all home medications.  - Coffee and vending available on the 1st and 5th floors; no cafeteria on site.  - You will need to arrive at 0800 on 03/14/24 at [facility] located at [address]. We are located on the 5th floor.  -Please be aware that arrival times may be subject to change up until the day of surgery.   - Feel free to contact us at: [phone] with any additional questions/concerns.     Patient to hold Advil, steroids and VIT D. Pt takes Humira on Wednesdays but can take it on Tuesday instead- Told pt if this is not ok I will call her back. Pt states she is a hard stick .

## 2024-03-08 NOTE — DISCHARGE INSTRUCTIONS
POST OPERATIVE INSTRUCTIONS  EYELID SURGERY      Patient Name:   Date of Birth:    Most procedures are performed with local anesthesia with intravenous sedation. While post-operative nausea is rare with this type of anesthesia, it is wise to start your diet by drinking clear liquids after surgery (e.g., 7-Up, Gatorade, ginger ale, etc.).  If clear liquids are tolerated well without nausea or vomiting, you may advance towards a regular diet.  Avoid “fatty foods” (eg, milk, pizza, hamburgers, etc.) on the day of surgery or as long as nausea persists.    Many eyelid procedures only require Extra Strength Tylenol for postoperative pain control.  For those patients with more extensive eyelid reconstruction, a prescription for a pain reliever is often given.  Patients may choose to take the prescribed pain reliever OR Extra Strength Tylenol, BUT NOT both together.  Unless specifically instructed, aspirin products such as Lara, Bufferin, Anacin, Excedrin, etc., should be avoided for at least one week after surgery.  This also includes Advil, Nuprin, Motrin and Ibuprofen.  Any other medications (except blood thinners), which you were taking prior to surgery, should be continued on their regular schedule.  Whenever lying down or sleeping, keep your head elevated on 2 or 3 pillows such that your head is always elevated above the level of your chest.    Apply cold compress to surgical site as much as possible for 2 to3 days following surgery.  A folded washcloth soaked in ice-cold water and wrung out works well for this.  A bag of frozen peas also works well as it is lightweight but molds to the eye.  Do not apply ice directly to the skin.  A small tube of eye ointment should be provided after surgery.  This is to be gently applied to the stitches twice daily.  If the eyes feel irritated, the ointment is safe to use in the eye for lubrication.  This will likely result in blurred vision but will go away once the ointment is  stopped.  EXCEPTION:  If a patch is taped over the eye, do NOT apply cold compresses or ointment.  Leave the patch undisturbed.  A physician will remove the patch at your first post-operative visit.  If your surgery was done on an outpatient basis, you should receive a phone call the day after surgery to check on your progress and to arrange for a post-operative clinic appointment.  The first post-operative visit will be one to two weeks after surgery to check the incisions and remove sutures if necessary.  A second post-operative visit six to eight weeks after surgery will assess the final surgical result.  The following problems should be reported to the office as soon as possible:  Continuous, brisk bleeding.  Please note that some oozing or drainage is common following a surgical procedure.  Do not try to clean dried blood from the surgical site.   This will likely only create more bleeding.  Temperature (fever) over 101?F.  Excessive pain at surgical site not relieved by the pain medication, especially if associated with protrusion of the eyeball.  Sudden loss of vision.  Please note that some blurriness is expected after surgery due to the ointment used around the eyes.  All patients should be able to check their vision even with eyelid swelling.  Double vision      If a problem should arise or you have a question, I can be reached at any time of the day or week by calling [phone].  I hope that your surgery experience with us is a positive one.  Please contact my office with any questions.  Sincerely,     MD Bernard Swenson MD

## 2024-03-08 NOTE — PAT
Spoke to  Ari Noble regarding Humria injection. Aide states that she is ok to take Humira and no need to hold or stop the medication for the procedure on 3/14/24

## 2024-03-14 ENCOUNTER — ANESTHESIA (OUTPATIENT)
Age: 34
End: 2024-03-14
Payer: COMMERCIAL

## 2024-03-14 ENCOUNTER — ANESTHESIA EVENT (OUTPATIENT)
Age: 34
End: 2024-03-14
Payer: COMMERCIAL

## 2024-03-14 ENCOUNTER — HOSPITAL ENCOUNTER (OUTPATIENT)
Age: 34
Setting detail: HOSPITAL OUTPATIENT SURGERY
Discharge: HOME | End: 2024-03-14
Attending: OPHTHALMOLOGY | Admitting: OPHTHALMOLOGY
Payer: COMMERCIAL

## 2024-03-14 VITALS
SYSTOLIC BLOOD PRESSURE: 114 MMHG | OXYGEN SATURATION: 97 % | WEIGHT: 207.4 LBS | HEIGHT: 64 IN | RESPIRATION RATE: 16 BRPM | HEART RATE: 80 BPM | TEMPERATURE: 98.1 F | DIASTOLIC BLOOD PRESSURE: 73 MMHG | BODY MASS INDEX: 35.41 KG/M2

## 2024-03-14 DIAGNOSIS — H00.16 CHALAZION OF BOTH EYES: ICD-10-CM

## 2024-03-14 DIAGNOSIS — H00.13 CHALAZION OF BOTH EYES: ICD-10-CM

## 2024-03-14 LAB
B-HCG UR QL: NEGATIVE
EXPIRATION DATE: NORMAL
INTERNAL NEGATIVE CONTROL: NEGATIVE
INTERNAL POSITIVE CONTROL: POSITIVE
Lab: NORMAL

## 2024-03-14 PROCEDURE — 25810000003 LACTATED RINGERS PER 1000 ML: Performed by: STUDENT IN AN ORGANIZED HEALTH CARE EDUCATION/TRAINING PROGRAM

## 2024-03-14 PROCEDURE — 11900 INJECT SKIN LESIONS </W 7: CPT | Performed by: OPHTHALMOLOGY

## 2024-03-14 PROCEDURE — 25010000002 DEXAMETHASONE SODIUM PHOSPHATE 20 MG/5ML SOLUTION: Performed by: STUDENT IN AN ORGANIZED HEALTH CARE EDUCATION/TRAINING PROGRAM

## 2024-03-14 PROCEDURE — 67801 REMOVE EYELID LESIONS: CPT | Performed by: OPHTHALMOLOGY

## 2024-03-14 PROCEDURE — 25010000002 BUPIVACAINE (PF) 0.5 % SOLUTION 10 ML VIAL: Performed by: OPHTHALMOLOGY

## 2024-03-14 PROCEDURE — 25010000002 PHENYLEPHRINE 10 MG/ML SOLUTION: Performed by: STUDENT IN AN ORGANIZED HEALTH CARE EDUCATION/TRAINING PROGRAM

## 2024-03-14 PROCEDURE — 88304 TISSUE EXAM BY PATHOLOGIST: CPT | Performed by: OPHTHALMOLOGY

## 2024-03-14 PROCEDURE — 25010000002 PROPOFOL 10 MG/ML EMULSION: Performed by: STUDENT IN AN ORGANIZED HEALTH CARE EDUCATION/TRAINING PROGRAM

## 2024-03-14 PROCEDURE — 67800 REMOVE EYELID LESION: CPT | Performed by: OPHTHALMOLOGY

## 2024-03-14 PROCEDURE — 25010000002 DROPERIDOL PER 5 MG: Performed by: STUDENT IN AN ORGANIZED HEALTH CARE EDUCATION/TRAINING PROGRAM

## 2024-03-14 PROCEDURE — 25010000002 FENTANYL CITRATE (PF) 50 MCG/ML SOLUTION: Performed by: STUDENT IN AN ORGANIZED HEALTH CARE EDUCATION/TRAINING PROGRAM

## 2024-03-14 PROCEDURE — 25010000002 ONDANSETRON PER 1 MG: Performed by: STUDENT IN AN ORGANIZED HEALTH CARE EDUCATION/TRAINING PROGRAM

## 2024-03-14 PROCEDURE — 81025 URINE PREGNANCY TEST: CPT | Performed by: OPHTHALMOLOGY

## 2024-03-14 PROCEDURE — 25010000002 TRIAMCINOLONE PER 10 MG: Performed by: OPHTHALMOLOGY

## 2024-03-14 RX ORDER — BALANCED SALT SOLUTION 6.4; .75; .48; .3; 3.9; 1.7 MG/ML; MG/ML; MG/ML; MG/ML; MG/ML; MG/ML
SOLUTION OPHTHALMIC AS NEEDED
Status: DISCONTINUED | OUTPATIENT
Start: 2024-03-14 | End: 2024-03-14 | Stop reason: HOSPADM

## 2024-03-14 RX ORDER — PROPOFOL 10 MG/ML
VIAL (ML) INTRAVENOUS AS NEEDED
Status: DISCONTINUED | OUTPATIENT
Start: 2024-03-14 | End: 2024-03-14 | Stop reason: SURG

## 2024-03-14 RX ORDER — FENTANYL CITRATE 50 UG/ML
25 INJECTION, SOLUTION INTRAMUSCULAR; INTRAVENOUS
Status: DISCONTINUED | OUTPATIENT
Start: 2024-03-14 | End: 2024-03-14 | Stop reason: HOSPADM

## 2024-03-14 RX ORDER — DROPERIDOL 2.5 MG/ML
INJECTION, SOLUTION INTRAMUSCULAR; INTRAVENOUS AS NEEDED
Status: DISCONTINUED | OUTPATIENT
Start: 2024-03-14 | End: 2024-03-14 | Stop reason: SURG

## 2024-03-14 RX ORDER — LIDOCAINE HYDROCHLORIDE 20 MG/ML
INJECTION, SOLUTION INFILTRATION; PERINEURAL AS NEEDED
Status: DISCONTINUED | OUTPATIENT
Start: 2024-03-14 | End: 2024-03-14 | Stop reason: SURG

## 2024-03-14 RX ORDER — SODIUM CHLORIDE 0.9 % (FLUSH) 0.9 %
10 SYRINGE (ML) INJECTION EVERY 12 HOURS SCHEDULED
Status: DISCONTINUED | OUTPATIENT
Start: 2024-03-14 | End: 2024-03-14 | Stop reason: HOSPADM

## 2024-03-14 RX ORDER — SODIUM CHLORIDE, SODIUM LACTATE, POTASSIUM CHLORIDE, CALCIUM CHLORIDE 600; 310; 30; 20 MG/100ML; MG/100ML; MG/100ML; MG/100ML
INJECTION, SOLUTION INTRAVENOUS CONTINUOUS PRN
Status: DISCONTINUED | OUTPATIENT
Start: 2024-03-14 | End: 2024-03-14 | Stop reason: SURG

## 2024-03-14 RX ORDER — ONDANSETRON 2 MG/ML
4 INJECTION INTRAMUSCULAR; INTRAVENOUS ONCE AS NEEDED
Status: DISCONTINUED | OUTPATIENT
Start: 2024-03-14 | End: 2024-03-14 | Stop reason: HOSPADM

## 2024-03-14 RX ORDER — HYDROCODONE BITARTRATE AND ACETAMINOPHEN 5; 325 MG/1; MG/1
1 TABLET ORAL EVERY 6 HOURS PRN
Qty: 15 TABLET | Refills: 0 | Status: SHIPPED | OUTPATIENT
Start: 2024-03-14 | End: 2024-03-17

## 2024-03-14 RX ORDER — TRIAMCINOLONE ACETONIDE 40 MG/ML
INJECTION, SUSPENSION INTRA-ARTICULAR; INTRAMUSCULAR AS NEEDED
Status: DISCONTINUED | OUTPATIENT
Start: 2024-03-14 | End: 2024-03-14 | Stop reason: HOSPADM

## 2024-03-14 RX ORDER — SODIUM CHLORIDE 0.9 % (FLUSH) 0.9 %
10 SYRINGE (ML) INJECTION AS NEEDED
Status: DISCONTINUED | OUTPATIENT
Start: 2024-03-14 | End: 2024-03-14 | Stop reason: HOSPADM

## 2024-03-14 RX ORDER — HYDROCODONE BITARTRATE AND ACETAMINOPHEN 5; 325 MG/1; MG/1
1 TABLET ORAL EVERY 6 HOURS PRN
Status: DISCONTINUED | OUTPATIENT
Start: 2024-03-14 | End: 2024-03-14 | Stop reason: HOSPADM

## 2024-03-14 RX ORDER — BUSPIRONE HYDROCHLORIDE 10 MG/1
TABLET ORAL
COMMUNITY
Start: 2024-02-19

## 2024-03-14 RX ORDER — BUPROPION HYDROCHLORIDE 150 MG/1
1 TABLET ORAL DAILY
COMMUNITY

## 2024-03-14 RX ORDER — ERYTHROMYCIN 5 MG/G
OINTMENT OPHTHALMIC 2 TIMES DAILY
Qty: 3.5 G | Refills: 1 | Status: SHIPPED | OUTPATIENT
Start: 2024-03-14

## 2024-03-14 RX ORDER — DIPHENHYDRAMINE HYDROCHLORIDE 50 MG/ML
12.5 INJECTION INTRAMUSCULAR; INTRAVENOUS
Status: DISCONTINUED | OUTPATIENT
Start: 2024-03-14 | End: 2024-03-14 | Stop reason: HOSPADM

## 2024-03-14 RX ORDER — DIPHENHYDRAMINE HYDROCHLORIDE 50 MG/ML
12.5 INJECTION INTRAMUSCULAR; INTRAVENOUS ONCE AS NEEDED
Status: DISCONTINUED | OUTPATIENT
Start: 2024-03-14 | End: 2024-03-14 | Stop reason: HOSPADM

## 2024-03-14 RX ORDER — PHENYLEPHRINE HYDROCHLORIDE 10 MG/ML
INJECTION INTRAVENOUS AS NEEDED
Status: DISCONTINUED | OUTPATIENT
Start: 2024-03-14 | End: 2024-03-14 | Stop reason: SURG

## 2024-03-14 RX ORDER — OXYCODONE HYDROCHLORIDE 5 MG/1
5 TABLET ORAL ONCE AS NEEDED
Status: DISCONTINUED | OUTPATIENT
Start: 2024-03-14 | End: 2024-03-14 | Stop reason: HOSPADM

## 2024-03-14 RX ORDER — OXYCODONE HYDROCHLORIDE 5 MG/1
10 TABLET ORAL EVERY 4 HOURS PRN
Status: DISCONTINUED | OUTPATIENT
Start: 2024-03-14 | End: 2024-03-14 | Stop reason: HOSPADM

## 2024-03-14 RX ORDER — DEXAMETHASONE SODIUM PHOSPHATE 4 MG/ML
INJECTION, SOLUTION INTRA-ARTICULAR; INTRALESIONAL; INTRAMUSCULAR; INTRAVENOUS; SOFT TISSUE AS NEEDED
Status: DISCONTINUED | OUTPATIENT
Start: 2024-03-14 | End: 2024-03-14 | Stop reason: SURG

## 2024-03-14 RX ORDER — ERYTHROMYCIN 5 MG/G
OINTMENT OPHTHALMIC AS NEEDED
Status: DISCONTINUED | OUTPATIENT
Start: 2024-03-14 | End: 2024-03-14 | Stop reason: HOSPADM

## 2024-03-14 RX ORDER — ROSUVASTATIN CALCIUM 40 MG/1
1 TABLET, COATED ORAL DAILY
COMMUNITY

## 2024-03-14 RX ORDER — ONDANSETRON 2 MG/ML
INJECTION INTRAMUSCULAR; INTRAVENOUS AS NEEDED
Status: DISCONTINUED | OUTPATIENT
Start: 2024-03-14 | End: 2024-03-14 | Stop reason: SURG

## 2024-03-14 RX ORDER — SODIUM CHLORIDE, SODIUM LACTATE, POTASSIUM CHLORIDE, CALCIUM CHLORIDE 600; 310; 30; 20 MG/100ML; MG/100ML; MG/100ML; MG/100ML
9 INJECTION, SOLUTION INTRAVENOUS CONTINUOUS PRN
Status: DISCONTINUED | OUTPATIENT
Start: 2024-03-14 | End: 2024-03-14 | Stop reason: HOSPADM

## 2024-03-14 RX ORDER — DROPERIDOL 2.5 MG/ML
0.62 INJECTION, SOLUTION INTRAMUSCULAR; INTRAVENOUS ONCE AS NEEDED
Status: DISCONTINUED | OUTPATIENT
Start: 2024-03-14 | End: 2024-03-14 | Stop reason: HOSPADM

## 2024-03-14 RX ORDER — HYDRALAZINE HYDROCHLORIDE 20 MG/ML
5 INJECTION INTRAMUSCULAR; INTRAVENOUS
Status: DISCONTINUED | OUTPATIENT
Start: 2024-03-14 | End: 2024-03-14 | Stop reason: HOSPADM

## 2024-03-14 RX ORDER — SULFAMETHOXAZOLE AND TRIMETHOPRIM 800; 160 MG/1; MG/1
TABLET ORAL
COMMUNITY
Start: 2024-03-05

## 2024-03-14 RX ORDER — LABETALOL HYDROCHLORIDE 5 MG/ML
5 INJECTION, SOLUTION INTRAVENOUS
Status: DISCONTINUED | OUTPATIENT
Start: 2024-03-14 | End: 2024-03-14 | Stop reason: HOSPADM

## 2024-03-14 RX ORDER — NALOXONE HCL 0.4 MG/ML
0.4 VIAL (ML) INJECTION AS NEEDED
Status: DISCONTINUED | OUTPATIENT
Start: 2024-03-14 | End: 2024-03-14 | Stop reason: HOSPADM

## 2024-03-14 RX ORDER — MIDAZOLAM HYDROCHLORIDE 1 MG/ML
1 INJECTION INTRAMUSCULAR; INTRAVENOUS
Status: DISCONTINUED | OUTPATIENT
Start: 2024-03-14 | End: 2024-03-14 | Stop reason: HOSPADM

## 2024-03-14 RX ORDER — ENALAPRILAT 1.25 MG/ML
2.5 INJECTION INTRAVENOUS ONCE AS NEEDED
Status: DISCONTINUED | OUTPATIENT
Start: 2024-03-14 | End: 2024-03-14 | Stop reason: HOSPADM

## 2024-03-14 RX ORDER — HYDROMORPHONE HYDROCHLORIDE 1 MG/ML
0.5 INJECTION, SOLUTION INTRAMUSCULAR; INTRAVENOUS; SUBCUTANEOUS
Status: DISCONTINUED | OUTPATIENT
Start: 2024-03-14 | End: 2024-03-14 | Stop reason: HOSPADM

## 2024-03-14 RX ORDER — SODIUM CHLORIDE 9 MG/ML
40 INJECTION, SOLUTION INTRAVENOUS AS NEEDED
Status: DISCONTINUED | OUTPATIENT
Start: 2024-03-14 | End: 2024-03-14 | Stop reason: HOSPADM

## 2024-03-14 RX ORDER — FENTANYL CITRATE 50 UG/ML
INJECTION, SOLUTION INTRAMUSCULAR; INTRAVENOUS AS NEEDED
Status: DISCONTINUED | OUTPATIENT
Start: 2024-03-14 | End: 2024-03-14 | Stop reason: SURG

## 2024-03-14 RX ADMIN — LIDOCAINE HYDROCHLORIDE 100 MG: 20 INJECTION, SOLUTION INFILTRATION; PERINEURAL at 10:15

## 2024-03-14 RX ADMIN — DROPERIDOL 0.62 MG: 2.5 INJECTION, SOLUTION INTRAMUSCULAR; INTRAVENOUS at 10:35

## 2024-03-14 RX ADMIN — PHENYLEPHRINE HYDROCHLORIDE 200 MCG: 10 INJECTION INTRAVENOUS at 10:41

## 2024-03-14 RX ADMIN — PROPOFOL 200 MG: 10 INJECTION, EMULSION INTRAVENOUS at 10:15

## 2024-03-14 RX ADMIN — HYDROCODONE BITARTRATE AND ACETAMINOPHEN 1 TABLET: 5; 325 TABLET ORAL at 11:14

## 2024-03-14 RX ADMIN — SODIUM CHLORIDE, POTASSIUM CHLORIDE, SODIUM LACTATE AND CALCIUM CHLORIDE 9 ML/HR: 600; 310; 30; 20 INJECTION, SOLUTION INTRAVENOUS at 09:32

## 2024-03-14 RX ADMIN — PHENYLEPHRINE HYDROCHLORIDE 200 MCG: 10 INJECTION INTRAVENOUS at 10:26

## 2024-03-14 RX ADMIN — SODIUM CHLORIDE, SODIUM LACTATE, POTASSIUM CHLORIDE, AND CALCIUM CHLORIDE: .6; .31; .03; .02 INJECTION, SOLUTION INTRAVENOUS at 10:11

## 2024-03-14 RX ADMIN — DEXAMETHASONE SODIUM PHOSPHATE 8 MG: 4 INJECTION, SOLUTION INTRAMUSCULAR; INTRAVENOUS at 10:25

## 2024-03-14 RX ADMIN — ONDANSETRON HYDROCHLORIDE 4 MG: 2 INJECTION, SOLUTION INTRAMUSCULAR; INTRAVENOUS at 10:25

## 2024-03-14 RX ADMIN — FENTANYL CITRATE 50 MCG: 50 INJECTION, SOLUTION INTRAMUSCULAR; INTRAVENOUS at 10:36

## 2024-03-14 RX ADMIN — PHENYLEPHRINE HYDROCHLORIDE 200 MCG: 10 INJECTION INTRAVENOUS at 10:33

## 2024-03-14 NOTE — OP NOTE
OPERATIVE NOTE    Patient Identification:  Name: Nara Jacinto  Age: 34 y.o.  Sex: female  :  1990  MRN: [id number]                                                     Preoperative diagnosis: Bilateral upper eyelid chalazion  Postoperative diagnosis: same  Procedure: 1) Bilateral upper eyelid lesion incision and drainage of chalazion                    2) Bilateral intralesional injection of steroid  Surgeon: Dr. De Oliveira who was present and scrubbed throughout all critical portions of the operation  Assistants: none  Anesthesia: General  EBL: less than 50cc  Specimens:  1) eyelid chalazion contents    Description of the procedure:     The patient was taken to the operating room and placed on the table in the supine position, where anesthesia was induced. 2% lidocaine with epinephrine and 0.5% marcaine in a 1:1 fashion was injected over the surgical site, and the patient was prepped and draped in the usual manner for orbitofacial surgery.     Corneal protectors were placed in both eyes.    A chalazion clamp was placed over the right upper eyelid and the lid was everted. A 15 Bard-Ortiz blade incision was made vertically through the palpebral conjunctiva. A chalazion scoop was used to remove the contents of the chalazion. Manhattan forceps and carlos scissors were then used to excise the capsule. The clamp was removed and the eyelid was palpated and felt to be free of any residual inflammation or chalazion contents. We then proceeded to inject 0.3cc of kenalog 40mg/ml into the chalazion wound bed to decrease recurrence.    There were noted to be two separate chalazion of the left upper eyelid.  There incised and drained in similar fashion.  We injected 0.6cc of kenalog into the chalazion wound bed of the left upper eyelid.    The corneal protectors were removed and antibiotic ophthalmic ointment was placed over the surgical site.     The patient was then awakened and taken from the operating room in good  condition, having tolerated the procedure well. There were no complications, and the estimated blood loss was less than 50 cc.

## 2024-03-14 NOTE — ANESTHESIA POSTPROCEDURE EVALUATION
Patient: Nara Jacinto    Procedure Summary       Date: 03/14/24 Room / Location: [facility] OR 03 / [facility] MAIN OR    Anesthesia Start: 1011 Anesthesia Stop: 1057    Procedure: BILATERAL UPPER LID CHALAZION INCISION AND DRAINAGE BILATERAL INTRALESIONAL INJECTION OF STERIOD (Bilateral: Eye) Diagnosis:     Surgeons: Bernard De Oliveira MD Provider: Jeremiah Chery MD    Anesthesia Type: general ASA Status: 3            Anesthesia Type: general    Vitals  Vitals Value Taken Time   /68 03/14/24 1058   Temp     Pulse 72 03/14/24 1058   Resp     SpO2 96 % 03/14/24 1058   Vitals shown include unfiled device data.        Post Anesthesia Care and Evaluation    Patient location during evaluation: bedside  Patient participation: complete - patient cannot participate  Level of consciousness: sleepy but conscious  Pain management: adequate    Airway patency: patent  Anesthetic complications: No anesthetic complications  PONV Status: controlled  Cardiovascular status: acceptable  Respiratory status: acceptable  Hydration status: acceptable

## 2024-03-14 NOTE — H&P
History & Physical       Patient: Nara Jacinto    Date of Admission: No admission date for patient encounter.    YOB: 1990    Medical Record Number: [id number]      Chief Complaints: bumps in eyelid      History of Present Illness: 34 y.o. female presents with chalazion multiple lids with conjunctival pyogenic granulomas. No new meds/health problems since office visit      Allergies: No Known Allergies    Review of systems negative, except pertaining to the HPI    Medications:   Home Medications:  No current facility-administered medications on file prior to encounter.     Current Outpatient Medications on File Prior to Encounter   Medication Sig    adapalene (DIFFERIN) 0.1 % cream 1 Application Every Night.    albuterol sulfate  (90 Base) MCG/ACT inhaler 1-2 puffs every 4-6 hours as needed for SOA    amLODIPine (NORVASC) 2.5 MG tablet amlodipine 2.5 mg tablet   Take 1 tablet every day by oral route.    atorvastatin (LIPITOR) 80 MG tablet Take 1 tablet by mouth Daily.    azelastine (OPTIVAR) 0.05 % ophthalmic solution azelastine 0.05 % eye drops   INSTILL 1 DROP INTO AFFECTED EYE(S) BY OPHTHALMIC ROUTE 2 TIMES PER DAY for 7 days    citalopram (CeleXA) 20 MG tablet citalopram 20 mg tablet   Take 1 tablet every day by oral route.    clindamycin (CLINDAGEL) 1 % gel clindamycin 1 % topical gel    D3-1000 25 MCG (1000 UT) capsule     dicyclomine (BENTYL) 20 MG tablet dicyclomine 20 mg tablet    Humira Pen 40 MG/0.4ML Pen-injector Kit Inject 40 mg under the skin into the appropriate area as directed 1 (One) Time Per Week.    ibuprofen (ADVIL,MOTRIN) 800 MG tablet     Levonorgestrel (MIRENA, 52 MG, IU) 52 mcg by Intrauterine route 1 (One) Time.    levothyroxine (SYNTHROID, LEVOTHROID) 200 MCG tablet Take 1 tablet by mouth Daily.    levothyroxine (SYNTHROID, LEVOTHROID) 25 MCG tablet Take 1 tablet by mouth Daily.    loratadine (CLARITIN) 10 MG tablet Take 1 tablet by mouth Daily.    mupirocin  "(BACTROBAN) 2 % ointment mupirocin 2 % topical ointment    predniSONE (DELTASONE) 20 MG tablet 2 tablets orally once daily x 5 days     Current Medications:  Scheduled Meds:  Continuous Infusions:No current facility-administered medications for this encounter.    PRN Meds:.    Past Medical History:   Diagnosis Date    Arthritis     Asthma     Carpal tunnel syndrome     Graves disease     History of cardiac murmur as a child     History of echocardiogram 05/12/2017    EF 51 %, mild mitral regurgitation    History of palpitations 2017    Hydradenitis     Hyperlipidemia     Hypertension     Hypothyroidism     DARIELA (obstructive sleep apnea)     Not on CPAP    Premature atrial contractions     7000 PAC's by Holter on 4/6/17. Per Cardiology--strongly suspect that her hypothyroidism was driving her premature atrial contractions    PVC's (premature ventricular contractions)     Thyroid nodule     Vitamin D deficiency         Past Surgical History:   Procedure Laterality Date    HERNIA REPAIR  2008 2014    REDUCTION MAMMAPLASTY  2018    TONSILLECTOMY          Social History     Occupational History    Not on file   Tobacco Use    Smoking status: Never    Smokeless tobacco: Never   Vaping Use    Vaping status: Never Used   Substance and Sexual Activity    Alcohol use: No    Drug use: No    Sexual activity: Yes     Partners: Male     Birth control/protection: I.U.D.      Social History     Social History Narrative    Not on file        Family History   Problem Relation Age of Onset    Coronary artery disease Maternal Grandfather         MGF with 3 stents    Thyroid disease Mother     Hyperlipidemia Father            Physical Exam   Ht 162.6 cm (64\")   Wt 92.5 kg (204 lb)   LMP 02/28/2024 (Approximate)   BMI 35.02 kg/m²   Constitutional: Alert, cooperative, in no acute distress    Head: Normocephalic.   Eyes:   Chalazion bul with pyogenic granuloma right upper eyelid  Neck: Normal range of motion.   Cardiovascular: Normal " rate.    Pulmonary/Chest: Effort normal.   Neurological: Alert.   Skin: Skin is warm.   Psychiatric: Normal mood and affect.       Assessment/Plan:  The patient voiced understanding of the risks, benefits, and alternative forms of treatment that were discussed and the patient consents to proceed with bilateral upper eyelid chalazion incision and drainage, bilateral intralesional injection of steroid.       Bernard De Oliveira MD

## 2024-03-14 NOTE — ANESTHESIA PROCEDURE NOTES
Airway  Urgency: elective    Date/Time: 3/14/2024 10:17 AM  Airway not difficult    General Information and Staff    Patient location during procedure: OR  Anesthesiologist: Jeremiah Chery MD    Indications and Patient Condition  Indications for airway management: airway protection    Preoxygenated: yes  Mask difficulty assessment: 0 - not attempted    Final Airway Details  Final airway type: supraglottic airway      Successful airway: unique  Size 4     Number of attempts at approach: 1  Assessment: lips, teeth, and gum same as pre-op and atraumatic intubation

## 2024-03-14 NOTE — ANESTHESIA PREPROCEDURE EVALUATION
Anesthesia Evaluation     Patient summary reviewed   no history of anesthetic complications:   NPO Solid Status: > 8 hours  NPO Liquid Status: > 2 hours           Airway   Mallampati: III  TM distance: >3 FB  Neck ROM: full  Large neck circumference  Comment: Macroglossia  Dental      Pulmonary    (+) asthma (uses rescue inhaler daily but recent ER visits for asthma exacerbation),sleep apnea  Cardiovascular   Exercise tolerance: good (4-7 METS)    (+) hypertension, dysrhythmias PAC, hyperlipidemia  (-) past MI, CAD, angina      Neuro/Psych  (+) psychiatric history Anxiety and Depression  (-) seizures, TIA, CVA  GI/Hepatic/Renal/Endo    (+) obesity, morbid obesity, thyroid problem (Graves disease s/p ablation) hypothyroidism  (-) hepatitis, liver disease, no renal disease, diabetes    Musculoskeletal     Abdominal    Substance History      OB/GYN          Other   arthritis,                 Anesthesia Plan    ASA 3     general     (Complications of general anesthesia include but not limited to awareness under anesthesia, nausea, vomiting, sore throat, hoarseness, chipped or cracked teeth, MI, CVA, or serious allergic reaction. )  intravenous induction     Anesthetic plan, risks, benefits, and alternatives have been provided, discussed and informed consent has been obtained with: patient.    CODE STATUS:

## 2024-03-15 LAB
LAB AP CASE REPORT: NORMAL
PATH REPORT.FINAL DX SPEC: NORMAL
PATH REPORT.GROSS SPEC: NORMAL

## 2024-06-24 NOTE — PAT
PAT call complete. Education provided to the patient on the following:    - Nothing to eat after midnight the night before your procedure, water and black coffee okay up to 2 hours before arrival time. 0630  - You will need to have someone drive you home after your procedure and remain with you for 24 hours after. The  will need to remain on site during your visit.  - Please remove all jewelry, including body piercing's, and leave any valuables at home. Only bring your drivers license and insurance card on day of procedure.  - Please arrive with a full bladder to provide a pregnancy test.   - Do not wear contact lenses; wear glasses and bring your case.  - Wash with antibacterial soap (such as Dial) the night before and morning of procedure.  - Be prepared to provide your last dose of all home medications.  - Coffee and vending available on the 1st and 5th floors; no cafeteria on site.  - You will need to arrive at 0830 on 6/27 at [facility] located at [address]. We are located on the 5th floor.  -Please be aware that arrival times may be subject to change up until the day of surgery.   - Feel free to contact us at: [phone] with any additional questions/concerns.       Patient instructed to hold vitamin d starting now.

## 2024-06-24 NOTE — DISCHARGE INSTRUCTIONS
POST OPERATIVE INSTRUCTIONS  EYELID SURGERY        Most procedures are performed with local anesthesia with intravenous sedation. While post-operative nausea is rare with this type of anesthesia, it is wise to start your diet by drinking clear liquids after surgery (e.g., 7-Up, Gatorade, ginger ale, etc.).  If clear liquids are tolerated well without nausea or vomiting, you may advance towards a regular diet.  Avoid “fatty foods” (eg, milk, pizza, hamburgers, etc.) on the day of surgery or as long as nausea persists.      Many eyelid procedures only require Extra Strength Tylenol for postoperative pain control.  For those patients with more extensive eyelid reconstruction, a prescription for a pain reliever is often given.  Patients may choose to take the prescribed pain reliever OR Extra Strength Tylenol, BUT NOT both together.  Unless specifically instructed, aspirin products such as Lara, Bufferin, Anacin, Excedrin, etc., should be avoided for at least one week after surgery.  This also includes Advil, Nuprin, Motrin and Ibuprofen.  Also wait one week to restart vitamins, fish oils, or herbal medications. Any other medications (except blood thinners), which you were taking prior to surgery, should be continued on their regular schedule. If you are on blood thinners, we will call you the day after your surgery to discuss when to restart.     Whenever lying down or sleeping, keep your head elevated on 2 or 3 pillows such that your head is always elevated above the level of your chest.      Apply cold compress to surgical site for 15 min every 1 hour while awake for first 3 days following surgery. Can then decrease frequency to 4-5x daily until followup. A folded washcloth soaked in ice-cold water and wrung out works well for this.  A bag of frozen peas/corn also works well as it is lightweight but molds to the eye.  Do not apply ice directly to the skin. Can also alternate between cold compresses and warm  compresses after the first 3 days.    A small tube of eye ointment should be provided after surgery.  This is to be gently applied to the stitches and/or eye twice daily.  If the eyes feel irritated, the ointment is safe to use in the eye for lubrication.  This will likely result in blurred vision but will go away once the ointment is stopped.    EXCEPTION:  If a patch is taped over the eye, do NOT apply cold compresses or ointment.  Leave the patch undisturbed.  A physician will remove the patch at your first post-operative visit.    If your surgery was done on an outpatient basis, you should receive a phone call the day after surgery to check on your progress and to arrange for a post-operative clinic appointment.  The first post-operative visit will be 1-2 weeks after surgery to check the incisions and remove sutures if necessary.  This appointment may be with Dr. Lugo, who is Dr. Olivier/Alvino's surgical fellow. A second post-operative visit six to eight weeks after surgery will assess the final surgical result.    The following problems should be reported to the office as soon as possible:  Continuous, brisk bleeding.  Please note that some oozing or drainage is common following a surgical procedure.  Do not try to clean dried blood from the surgical site.   This will likely only create more bleeding.  Temperature (fever) over 101?F.  Excessive pain at surgical site not relieved by the pain medication, especially if associated with protrusion of the eyeball.  Sudden loss of vision.  Please note that some blurriness is expected after surgery due to the ointment used around the eyes.  All patients should be able to check their vision even with eyelid swelling.      If a problem should arise or you have a question, someone from our team can be reached at any time of the day or week by calling [phone].  I hope that your surgery experience with us is a positive one.  Please contact my office with any  questions.  Sincerely,       MD Bernard Swenson MD

## 2024-06-27 ENCOUNTER — HOSPITAL ENCOUNTER (OUTPATIENT)
Age: 34
Setting detail: HOSPITAL OUTPATIENT SURGERY
Discharge: HOME | End: 2024-06-27
Attending: OPHTHALMOLOGY | Admitting: OPHTHALMOLOGY

## 2024-06-27 ENCOUNTER — ANESTHESIA EVENT (OUTPATIENT)
Age: 34
End: 2024-06-27

## 2024-06-27 ENCOUNTER — ANESTHESIA (OUTPATIENT)
Age: 34
End: 2024-06-27

## 2024-06-27 VITALS
RESPIRATION RATE: 16 BRPM | WEIGHT: 225.2 LBS | BODY MASS INDEX: 37.52 KG/M2 | HEIGHT: 65 IN | DIASTOLIC BLOOD PRESSURE: 82 MMHG | OXYGEN SATURATION: 95 % | HEART RATE: 86 BPM | TEMPERATURE: 97.2 F | SYSTOLIC BLOOD PRESSURE: 112 MMHG

## 2024-06-27 DIAGNOSIS — H02.59 FLOPPY LID SYNDROME: ICD-10-CM

## 2024-06-27 PROCEDURE — 25010000002 BUPIVACAINE (PF) 0.5 % SOLUTION 10 ML VIAL: Performed by: OPHTHALMOLOGY

## 2024-06-27 PROCEDURE — 67917 REPAIR EYELID DEFECT: CPT | Performed by: OPHTHALMOLOGY

## 2024-06-27 PROCEDURE — 25010000002 DEXAMETHASONE SODIUM PHOSPHATE 20 MG/5ML SOLUTION: Performed by: ANESTHESIOLOGY

## 2024-06-27 PROCEDURE — 25010000002 CEFAZOLIN PER 500 MG: Performed by: OPHTHALMOLOGY

## 2024-06-27 PROCEDURE — 67961 REVISION OF EYELID: CPT | Performed by: OPHTHALMOLOGY

## 2024-06-27 PROCEDURE — 88304 TISSUE EXAM BY PATHOLOGIST: CPT | Performed by: OPHTHALMOLOGY

## 2024-06-27 PROCEDURE — 81025 URINE PREGNANCY TEST: CPT | Performed by: ANESTHESIOLOGY

## 2024-06-27 PROCEDURE — 25010000002 PROPOFOL 10 MG/ML EMULSION: Performed by: NURSE ANESTHETIST, CERTIFIED REGISTERED

## 2024-06-27 PROCEDURE — 25010000002 PHENYLEPHRINE 10 MG/ML SOLUTION: Performed by: ANESTHESIOLOGY

## 2024-06-27 PROCEDURE — 25010000002 FENTANYL CITRATE (PF) 50 MCG/ML SOLUTION: Performed by: NURSE ANESTHETIST, CERTIFIED REGISTERED

## 2024-06-27 PROCEDURE — 25010000002 ONDANSETRON PER 1 MG: Performed by: ANESTHESIOLOGY

## 2024-06-27 PROCEDURE — 25010000002 KETOROLAC TROMETHAMINE PER 15 MG: Performed by: ANESTHESIOLOGY

## 2024-06-27 PROCEDURE — 25810000003 LACTATED RINGERS PER 1000 ML: Performed by: ANESTHESIOLOGY

## 2024-06-27 PROCEDURE — 25010000002 GLYCOPYRROLATE 1 MG/5ML SOLUTION: Performed by: NURSE ANESTHETIST, CERTIFIED REGISTERED

## 2024-06-27 RX ORDER — LIDOCAINE HYDROCHLORIDE 20 MG/ML
INJECTION, SOLUTION INFILTRATION; PERINEURAL AS NEEDED
Status: DISCONTINUED | OUTPATIENT
Start: 2024-06-27 | End: 2024-06-27 | Stop reason: SURG

## 2024-06-27 RX ORDER — DROPERIDOL 2.5 MG/ML
0.62 INJECTION, SOLUTION INTRAMUSCULAR; INTRAVENOUS
Status: DISCONTINUED | OUTPATIENT
Start: 2024-06-27 | End: 2024-06-27 | Stop reason: HOSPADM

## 2024-06-27 RX ORDER — NALOXONE HCL 0.4 MG/ML
0.2 VIAL (ML) INJECTION AS NEEDED
Status: DISCONTINUED | OUTPATIENT
Start: 2024-06-27 | End: 2024-06-27 | Stop reason: HOSPADM

## 2024-06-27 RX ORDER — HYDROMORPHONE HYDROCHLORIDE 1 MG/ML
0.5 INJECTION, SOLUTION INTRAMUSCULAR; INTRAVENOUS; SUBCUTANEOUS
Status: DISCONTINUED | OUTPATIENT
Start: 2024-06-27 | End: 2024-06-27 | Stop reason: HOSPADM

## 2024-06-27 RX ORDER — OXYCODONE AND ACETAMINOPHEN 7.5; 325 MG/1; MG/1
1 TABLET ORAL EVERY 4 HOURS PRN
Status: DISCONTINUED | OUTPATIENT
Start: 2024-06-27 | End: 2024-06-27 | Stop reason: HOSPADM

## 2024-06-27 RX ORDER — ACETAMINOPHEN 500 MG
1000 TABLET ORAL ONCE
Status: COMPLETED | OUTPATIENT
Start: 2024-06-27 | End: 2024-06-27

## 2024-06-27 RX ORDER — KETOROLAC TROMETHAMINE 30 MG/ML
INJECTION, SOLUTION INTRAMUSCULAR; INTRAVENOUS AS NEEDED
Status: DISCONTINUED | OUTPATIENT
Start: 2024-06-27 | End: 2024-06-27 | Stop reason: SURG

## 2024-06-27 RX ORDER — GLYCOPYRROLATE 0.2 MG/ML
INJECTION INTRAMUSCULAR; INTRAVENOUS AS NEEDED
Status: DISCONTINUED | OUTPATIENT
Start: 2024-06-27 | End: 2024-06-27 | Stop reason: SURG

## 2024-06-27 RX ORDER — HYDROCODONE BITARTRATE AND ACETAMINOPHEN 5; 325 MG/1; MG/1
1 TABLET ORAL ONCE AS NEEDED
Status: COMPLETED | OUTPATIENT
Start: 2024-06-27 | End: 2024-06-27

## 2024-06-27 RX ORDER — MIDAZOLAM HYDROCHLORIDE 1 MG/ML
1 INJECTION INTRAMUSCULAR; INTRAVENOUS
Status: DISCONTINUED | OUTPATIENT
Start: 2024-06-27 | End: 2024-06-27 | Stop reason: HOSPADM

## 2024-06-27 RX ORDER — EPHEDRINE SULFATE 50 MG/ML
INJECTION, SOLUTION INTRAVENOUS AS NEEDED
Status: DISCONTINUED | OUTPATIENT
Start: 2024-06-27 | End: 2024-06-27 | Stop reason: SURG

## 2024-06-27 RX ORDER — SODIUM CHLORIDE 0.9 % (FLUSH) 0.9 %
3 SYRINGE (ML) INJECTION EVERY 12 HOURS SCHEDULED
Status: DISCONTINUED | OUTPATIENT
Start: 2024-06-27 | End: 2024-06-27 | Stop reason: HOSPADM

## 2024-06-27 RX ORDER — LIDOCAINE HYDROCHLORIDE 10 MG/ML
20 INJECTION, SOLUTION INFILTRATION; PERINEURAL ONCE
Status: DISCONTINUED | OUTPATIENT
Start: 2024-06-27 | End: 2024-06-27 | Stop reason: HOSPADM

## 2024-06-27 RX ORDER — LABETALOL HYDROCHLORIDE 5 MG/ML
5 INJECTION, SOLUTION INTRAVENOUS
Status: DISCONTINUED | OUTPATIENT
Start: 2024-06-27 | End: 2024-06-27 | Stop reason: HOSPADM

## 2024-06-27 RX ORDER — SODIUM CHLORIDE 0.9 % (FLUSH) 0.9 %
3-10 SYRINGE (ML) INJECTION AS NEEDED
Status: DISCONTINUED | OUTPATIENT
Start: 2024-06-27 | End: 2024-06-27 | Stop reason: HOSPADM

## 2024-06-27 RX ORDER — DIPHENHYDRAMINE HYDROCHLORIDE 50 MG/ML
12.5 INJECTION INTRAMUSCULAR; INTRAVENOUS
Status: DISCONTINUED | OUTPATIENT
Start: 2024-06-27 | End: 2024-06-27 | Stop reason: HOSPADM

## 2024-06-27 RX ORDER — FLUMAZENIL 0.1 MG/ML
0.2 INJECTION INTRAVENOUS AS NEEDED
Status: DISCONTINUED | OUTPATIENT
Start: 2024-06-27 | End: 2024-06-27 | Stop reason: HOSPADM

## 2024-06-27 RX ORDER — ERYTHROMYCIN 5 MG/G
OINTMENT OPHTHALMIC AS NEEDED
Status: DISCONTINUED | OUTPATIENT
Start: 2024-06-27 | End: 2024-06-27 | Stop reason: HOSPADM

## 2024-06-27 RX ORDER — FENTANYL CITRATE 50 UG/ML
50 INJECTION, SOLUTION INTRAMUSCULAR; INTRAVENOUS ONCE AS NEEDED
Status: DISCONTINUED | OUTPATIENT
Start: 2024-06-27 | End: 2024-06-27 | Stop reason: HOSPADM

## 2024-06-27 RX ORDER — FAMOTIDINE 10 MG/ML
20 INJECTION, SOLUTION INTRAVENOUS ONCE
Status: COMPLETED | OUTPATIENT
Start: 2024-06-27 | End: 2024-06-27

## 2024-06-27 RX ORDER — SODIUM CHLORIDE, SODIUM LACTATE, POTASSIUM CHLORIDE, CALCIUM CHLORIDE 600; 310; 30; 20 MG/100ML; MG/100ML; MG/100ML; MG/100ML
9 INJECTION, SOLUTION INTRAVENOUS CONTINUOUS
Status: DISCONTINUED | OUTPATIENT
Start: 2024-06-27 | End: 2024-06-27 | Stop reason: HOSPADM

## 2024-06-27 RX ORDER — FENTANYL CITRATE 50 UG/ML
50 INJECTION, SOLUTION INTRAMUSCULAR; INTRAVENOUS
Status: DISCONTINUED | OUTPATIENT
Start: 2024-06-27 | End: 2024-06-27 | Stop reason: HOSPADM

## 2024-06-27 RX ORDER — PROMETHAZINE HYDROCHLORIDE 12.5 MG/1
25 TABLET ORAL ONCE AS NEEDED
Status: DISCONTINUED | OUTPATIENT
Start: 2024-06-27 | End: 2024-06-27 | Stop reason: HOSPADM

## 2024-06-27 RX ORDER — PROMETHAZINE HYDROCHLORIDE 25 MG/1
25 SUPPOSITORY RECTAL ONCE AS NEEDED
Status: DISCONTINUED | OUTPATIENT
Start: 2024-06-27 | End: 2024-06-27 | Stop reason: HOSPADM

## 2024-06-27 RX ORDER — ERYTHROMYCIN 5 MG/G
OINTMENT OPHTHALMIC 2 TIMES DAILY
Qty: 3.5 G | Refills: 1 | Status: SHIPPED | OUTPATIENT
Start: 2024-06-27

## 2024-06-27 RX ORDER — NEOMYCIN POLYMYXIN B SULFATES AND DEXAMETHASONE 3.5; 10000; 1 MG/ML; [USP'U]/ML; MG/ML
1 SUSPENSION/ DROPS OPHTHALMIC 3 TIMES DAILY
Qty: 5 ML | Refills: 0 | Status: SHIPPED | OUTPATIENT
Start: 2024-06-27

## 2024-06-27 RX ORDER — PROPOFOL 10 MG/ML
VIAL (ML) INTRAVENOUS AS NEEDED
Status: DISCONTINUED | OUTPATIENT
Start: 2024-06-27 | End: 2024-06-27 | Stop reason: SURG

## 2024-06-27 RX ORDER — HYDRALAZINE HYDROCHLORIDE 20 MG/ML
5 INJECTION INTRAMUSCULAR; INTRAVENOUS
Status: DISCONTINUED | OUTPATIENT
Start: 2024-06-27 | End: 2024-06-27 | Stop reason: HOSPADM

## 2024-06-27 RX ORDER — FENTANYL CITRATE 50 UG/ML
INJECTION, SOLUTION INTRAMUSCULAR; INTRAVENOUS AS NEEDED
Status: DISCONTINUED | OUTPATIENT
Start: 2024-06-27 | End: 2024-06-27 | Stop reason: SURG

## 2024-06-27 RX ORDER — SCOLOPAMINE TRANSDERMAL SYSTEM 1 MG/1
1 PATCH, EXTENDED RELEASE TRANSDERMAL ONCE
Status: DISCONTINUED | OUTPATIENT
Start: 2024-06-27 | End: 2024-06-27 | Stop reason: HOSPADM

## 2024-06-27 RX ORDER — PHENYLEPHRINE HYDROCHLORIDE 10 MG/ML
INJECTION INTRAVENOUS AS NEEDED
Status: DISCONTINUED | OUTPATIENT
Start: 2024-06-27 | End: 2024-06-27 | Stop reason: SURG

## 2024-06-27 RX ORDER — IBUPROFEN 800 MG/1
800 TABLET ORAL EVERY 8 HOURS PRN
COMMUNITY
Start: 2024-01-01 | End: 2024-06-27 | Stop reason: HOSPADM

## 2024-06-27 RX ORDER — DEXAMETHASONE SODIUM PHOSPHATE 4 MG/ML
INJECTION, SOLUTION INTRA-ARTICULAR; INTRALESIONAL; INTRAMUSCULAR; INTRAVENOUS; SOFT TISSUE AS NEEDED
Status: DISCONTINUED | OUTPATIENT
Start: 2024-06-27 | End: 2024-06-27 | Stop reason: SURG

## 2024-06-27 RX ORDER — ONDANSETRON 2 MG/ML
4 INJECTION INTRAMUSCULAR; INTRAVENOUS ONCE AS NEEDED
Status: DISCONTINUED | OUTPATIENT
Start: 2024-06-27 | End: 2024-06-27 | Stop reason: HOSPADM

## 2024-06-27 RX ORDER — HYDROCODONE BITARTRATE AND ACETAMINOPHEN 5; 325 MG/1; MG/1
1 TABLET ORAL EVERY 6 HOURS PRN
Qty: 15 TABLET | Refills: 0 | Status: SHIPPED | OUTPATIENT
Start: 2024-06-27 | End: 2024-07-01

## 2024-06-27 RX ORDER — ONDANSETRON 2 MG/ML
INJECTION INTRAMUSCULAR; INTRAVENOUS AS NEEDED
Status: DISCONTINUED | OUTPATIENT
Start: 2024-06-27 | End: 2024-06-27 | Stop reason: SURG

## 2024-06-27 RX ADMIN — PHENYLEPHRINE HYDROCHLORIDE 200 MCG: 10 INJECTION INTRAVENOUS at 13:11

## 2024-06-27 RX ADMIN — FAMOTIDINE 20 MG: 10 INJECTION, SOLUTION INTRAVENOUS at 12:35

## 2024-06-27 RX ADMIN — FENTANYL CITRATE 50 MCG: 50 INJECTION, SOLUTION INTRAMUSCULAR; INTRAVENOUS at 13:00

## 2024-06-27 RX ADMIN — DEXAMETHASONE SODIUM PHOSPHATE 8 MG: 4 INJECTION, SOLUTION INTRAMUSCULAR; INTRAVENOUS at 13:09

## 2024-06-27 RX ADMIN — PHENYLEPHRINE HYDROCHLORIDE 100 MCG: 10 INJECTION INTRAVENOUS at 13:07

## 2024-06-27 RX ADMIN — HYDROCODONE BITARTRATE AND ACETAMINOPHEN 1 TABLET: 5; 325 TABLET ORAL at 14:26

## 2024-06-27 RX ADMIN — LIDOCAINE HYDROCHLORIDE 100 MG: 20 INJECTION, SOLUTION INFILTRATION; PERINEURAL at 12:54

## 2024-06-27 RX ADMIN — EPHEDRINE SULFATE 5 MG: 50 INJECTION INTRAVENOUS at 13:30

## 2024-06-27 RX ADMIN — SCOPALAMINE 1 PATCH: 1 PATCH, EXTENDED RELEASE TRANSDERMAL at 12:35

## 2024-06-27 RX ADMIN — PHENYLEPHRINE HYDROCHLORIDE 100 MCG: 10 INJECTION INTRAVENOUS at 13:30

## 2024-06-27 RX ADMIN — EPHEDRINE SULFATE 5 MG: 50 INJECTION INTRAVENOUS at 13:52

## 2024-06-27 RX ADMIN — PROPOFOL 170 MG: 10 INJECTION, EMULSION INTRAVENOUS at 12:54

## 2024-06-27 RX ADMIN — EPHEDRINE SULFATE 5 MG: 50 INJECTION INTRAVENOUS at 13:24

## 2024-06-27 RX ADMIN — PHENYLEPHRINE HYDROCHLORIDE 100 MCG: 10 INJECTION INTRAVENOUS at 13:52

## 2024-06-27 RX ADMIN — ONDANSETRON 4 MG: 2 INJECTION INTRAMUSCULAR; INTRAVENOUS at 13:52

## 2024-06-27 RX ADMIN — SODIUM CHLORIDE, POTASSIUM CHLORIDE, SODIUM LACTATE AND CALCIUM CHLORIDE 9 ML/HR: 600; 310; 30; 20 INJECTION, SOLUTION INTRAVENOUS at 11:40

## 2024-06-27 RX ADMIN — GLYCOPYRROLATE 0.2 MG: 0.2 INJECTION INTRAMUSCULAR; INTRAVENOUS at 12:58

## 2024-06-27 RX ADMIN — SODIUM CHLORIDE 2000 MG: 900 INJECTION INTRAVENOUS at 12:42

## 2024-06-27 RX ADMIN — PHENYLEPHRINE HYDROCHLORIDE 200 MCG: 10 INJECTION INTRAVENOUS at 13:22

## 2024-06-27 RX ADMIN — ACETAMINOPHEN 1000 MG: 500 TABLET ORAL at 12:35

## 2024-06-27 RX ADMIN — PHENYLEPHRINE HYDROCHLORIDE 100 MCG: 10 INJECTION INTRAVENOUS at 13:13

## 2024-06-27 RX ADMIN — KETOROLAC TROMETHAMINE 30 MG: 30 INJECTION INTRAMUSCULAR; INTRAVENOUS at 13:52

## 2024-06-27 NOTE — ANESTHESIA PROCEDURE NOTES
Airway  Urgency: elective    Date/Time: 6/27/2024 12:56 PM    General Information and Staff    Patient location during procedure: OR  Anesthesiologist: Rogers Morales MD  CRNA/CAA: Manda Salcedo CRNA    Indications and Patient Condition  Indications for airway management: airway protection    Preoxygenated: yes  Mask difficulty assessment: 1 - vent by mask    Final Airway Details  Final airway type: supraglottic airway      Successful airway: LMA  Size 4     Number of attempts at approach: 1  Assessment: lips, teeth, and gum same as pre-op and atraumatic intubation

## 2024-06-27 NOTE — H&P
History & Physical       Patient: Nara Jacinto    Date of Admission: No admission date for patient encounter.    YOB: 1990    Medical Record Number: [id number]      Chief Complaints: floppy eyelid syndrome with severe mucus production      History of Present Illness: 34 y.o. female presents with bul/bll ectropion with floppy eyelid syndrome. No new meds/health problems since office visit      Allergies: No Known Allergies    Review of systems negative, except pertaining to the HPI    Medications:   Home Medications:  No current facility-administered medications on file prior to encounter.     Current Outpatient Medications on File Prior to Encounter   Medication Sig    adapalene (DIFFERIN) 0.1 % cream 1 Application Every Night.    albuterol sulfate  (90 Base) MCG/ACT inhaler 1-2 puffs every 4-6 hours as needed for SOA    amLODIPine (NORVASC) 2.5 MG tablet amlodipine 2.5 mg tablet   Take 1 tablet every day by oral route.    atorvastatin (LIPITOR) 80 MG tablet Take 1 tablet by mouth Daily.    azelastine (OPTIVAR) 0.05 % ophthalmic solution azelastine 0.05 % eye drops   INSTILL 1 DROP INTO AFFECTED EYE(S) BY OPHTHALMIC ROUTE 2 TIMES PER DAY for 7 days    Bactrim -160 MG per tablet Take 1 tablet every 12 hours by oral route for 10 days.    buPROPion XL (WELLBUTRIN XL) 150 MG 24 hr tablet Take 1 tablet by mouth Daily.    busPIRone (BUSPAR) 10 MG tablet     citalopram (CeleXA) 20 MG tablet citalopram 20 mg tablet   Take 1 tablet every day by oral route.    clindamycin (CLINDAGEL) 1 % gel clindamycin 1 % topical gel    D3-1000 25 MCG (1000 UT) capsule     erythromycin (ROMYCIN) 5 MG/GM ophthalmic ointment Administer  to both eyes 2 (Two) Times a Day. Apply to incision 2x/day for 1 week    Humira Pen 40 MG/0.4ML Pen-injector Kit Inject 40 mg under the skin into the appropriate area as directed 1 (One) Time Per Week.    Levonorgestrel (MIRENA, 52 MG, IU) 52 mcg by Intrauterine route 1 (One)  Body Location Override (Optional): Right Mid Antihelix Detail Level: Detailed Time.    levothyroxine (SYNTHROID, LEVOTHROID) 200 MCG tablet Take 1 tablet by mouth Daily.    levothyroxine (SYNTHROID, LEVOTHROID) 25 MCG tablet Take 1 tablet by mouth Daily.    loratadine (CLARITIN) 10 MG tablet Take 1 tablet by mouth Daily.    mupirocin (BACTROBAN) 2 % ointment mupirocin 2 % topical ointment    rosuvastatin (CRESTOR) 40 MG tablet Take 1 tablet by mouth Daily.     Current Medications:  Scheduled Meds:  Continuous Infusions:No current facility-administered medications for this encounter.    PRN Meds:.    Past Medical History:   Diagnosis Date    Arthritis     Asthma     Carpal tunnel syndrome     Graves disease     History of cardiac murmur as a child     History of echocardiogram 05/12/2017    EF 51 %, mild mitral regurgitation    History of palpitations 2017    Hydradenitis     Hyperlipidemia     Hypertension     Hypothyroidism     DARIELA (obstructive sleep apnea)     Not on CPAP    Premature atrial contractions     7000 PAC's by Holter on 4/6/17. Per Cardiology--strongly suspect that her hypothyroidism was driving her premature atrial contractions    PVC's (premature ventricular contractions)     Thyroid nodule     Vitamin D deficiency         Past Surgical History:   Procedure Laterality Date    CHALAZION EXCISION Bilateral 03/14/2024    Procedure: BILATERAL UPPER LID CHALAZION INCISION AND DRAINAGE BILATERAL INTRALESIONAL INJECTION OF STERIOD;  Surgeon: Bernard De Oliveira MD;  Location: [facility] OR;  Service: Ophthalmology;  Laterality: Bilateral;    HERNIA REPAIR  2008 2014    REDUCTION MAMMAPLASTY  2018    THYROID SURGERY      TONSILLECTOMY          Social History     Occupational History    Not on file   Tobacco Use    Smoking status: Never    Smokeless tobacco: Never   Vaping Use    Vaping status: Never Used   Substance and Sexual Activity    Alcohol use: No    Drug use: No    Sexual activity: Yes     Partners: Male     Birth control/protection: I.U.D.      Social History     Social  "History Narrative    Not on file        Family History   Problem Relation Age of Onset    Coronary artery disease Maternal Grandfather         MGF with 3 stents    Thyroid disease Mother     Hyperlipidemia Father            Physical Exam   Ht 167.6 cm (66\")   Wt 96.6 kg (213 lb)   LMP 06/17/2024 (Approximate)   BMI 34.38 kg/m²   Constitutional: Alert, cooperative, in no acute distress    Head: Normocephalic.   Eyes:   FES with ectropion all 4 lids  Neck: Normal range of motion.   Cardiovascular: Normal rate.    Pulmonary/Chest: Effort normal.   Neurological: Alert.   Skin: Skin is warm.   Psychiatric: Normal mood and affect.       Assessment/Plan:  The patient voiced understanding of the risks, benefits, and alternative forms of treatment that were discussed and the patient consents to proceed with bilateral upper eyelid ectropion repair, bilateral lower eyelid ectropion repair.       Bernard De Oliveira MD                " Add 54103 Cpt? (Important Note: In 2017 The Use Of 13387 Is Being Tracked By Cms To Determine Future Global Period Reimbursement For Global Periods): yes Wound Diameter In Cm(Optional): 0 Wound Crusting?: clean Wound Edema?: mild Wound Color?: pink Patient To Follow-Up With?: their primary dermatologist Follow Up Units (Optional): 6 Follow Up Time Frame (Optional): months

## 2024-06-27 NOTE — ANESTHESIA POSTPROCEDURE EVALUATION
"Patient: Nara Jacinto    Procedure Summary       Date: 06/27/24 Room / Location: [facility] OR 02 / [facility] MAIN OR    Anesthesia Start: 1249 Anesthesia Stop: 1406    Procedure: BILATERAL UPPER LID ECTROPION REPAIR BILATERAL LOWER LID ECTROPION REPAIR (Bilateral: Eye) Diagnosis:     Surgeons: Bernard De Oliveira MD Provider: Rogers Morales MD    Anesthesia Type: general ASA Status: 3            Anesthesia Type: general    Vitals  Vitals Value Taken Time   /91 06/27/24 1417   Temp 36.2 °C (97.2 °F) 06/27/24 1410   Pulse 109 06/27/24 1430   Resp 18 06/27/24 1417   SpO2 95 % 06/27/24 1417   Vitals shown include unfiled device data.        Post Anesthesia Care and Evaluation    Level of consciousness: awake and alert  Pain management: adequate    Airway patency: patent  Anesthetic complications: No anesthetic complications  PONV Status: none  Cardiovascular status: acceptable  Respiratory status: acceptable  Hydration status: acceptable    Comments: /72 (BP Location: Left arm, Patient Position: Lying)   Pulse 89   Temp 36.2 °C (97.2 °F) (Oral)   Resp 18   Ht 165.1 cm (65\")   Wt 102 kg (225 lb 3.2 oz)   LMP 06/17/2024 (Approximate)   SpO2 95%   BMI 37.48 kg/m²       "

## 2024-06-27 NOTE — OP NOTE
OPERATIVE NOTE    Patient Identification:  Name: Nara Jacinto  Age: 34 y.o.  Sex: female  :  1990  MRN: [id number]                                               Preoperative diagnosis: bilateral upper lid ectropion, bilateral lower eyelid ectropion   Postoperative diagnosis: same  Procedure: 1) Bilateral lower eyelid ectropion repair                     2) Bilateral upper eyelid ectropion repair via wedge resection <  Surgeon: Dr. De Oliveira who was present and scrubbed throughout all critical portions of the operation  Assistants: Sun Lugo MD  Anesthesia: General  EBL: less than 50cc  Specimens: * No orders in the log *    Description of the procedure:     The patient was taken to the operating room and placed on the table in the supine position, where anesthesia was induced. 2% lidocaine with epinephrine and 0.5% marcaine in a 1:1 fashion was injected over the surgical site, and the patient was prepped and draped in the usual manner for orbitofacial surgery.     Corneal protectors were placed in both eyes.     A 15 Bard-Ortiz blade incision was made at the right lateral canthus. Sharp dissection was carried down to the lateral orbital rim periosteum. The inferior ramus of the lateral canthal tendon was identified and severed with sharp dissection. A full-thickness en bloc excision of the lateral aspect of the tarsal plate was carried out with sharp dissection, and bleeding was controlled with electrocauterization. The cut edge of the tarsal plate was advanced to the lateral orbital rim periosteum, where it was sutured with 5-0 vicryl suture to the internal aspect of the lateral orbital rim periosteum. The skin  was closed with 5-0 fast absorbing suture.     The exact same procedure was preformed over the contralateral lid    We then performed ectropion repair of upper eyelids via wedge resection <1  A 15 Bard-Ortiz blade incision was made in standard pentagonal wedge resection of the upper  eyelid margin. Sharp dissection was carried down to the tarsal plate. A full-thickness en bloc excision of the eyelid margin and tarsus was carried out. The wedge segment was removed in its entirety. The tarsal plate was closed with interrupted 6-0 vicryl sutures, partially penetrating the tarsus to avoid corneal irritation. The eyelid margin was closed with a 6-0 silk suture with vertical mattress fashion through the mucocutaneous junction. The skin was closed with 5-0 fast absorbing suture.  A small standing cutaneous deformity was removed by making an eyelid crease incision    The exact same procedure was performed to the contralateral eyelid.    The corneal protectors were removed and antibiotic ophthalmic ointment was placed over the surgical site.     The patient was then awakened and taken from the operating room in good condition, having tolerated the procedure well. There were no complications, and the estimated blood loss was less than 50 cc.

## 2024-06-27 NOTE — ANESTHESIA PREPROCEDURE EVALUATION
Anesthesia Evaluation     Patient summary reviewed and Nursing notes reviewed   history of anesthetic complications:  PONV  NPO Solid Status: > 8 hours  NPO Liquid Status: > 8 hours           Airway   Mallampati: II  TM distance: >3 FB  Neck ROM: full  No difficulty expected  Dental - normal exam     Pulmonary    (+) asthma,sleep apnea on CPAP  (-) rhonchi, decreased breath sounds, wheezes, not a smoker  Cardiovascular   Exercise tolerance: good (4-7 METS)    Rhythm: regular  Rate: normal    (+) hypertension, dysrhythmias PAC, hyperlipidemia  (-) CAD, angina, DASILVA, murmur      Neuro/Psych  (+) psychiatric history Anxiety and Depression  (-) seizures, CVA  GI/Hepatic/Renal/Endo    (+) morbid obesity, thyroid problem hypothyroidism  (-) liver disease, no renal disease, diabetes    Musculoskeletal     Abdominal     Abdomen: soft.   Substance History      OB/GYN          Other   arthritis,                 Anesthesia Plan    ASA 3     general     intravenous induction     Anesthetic plan, risks, benefits, and alternatives have been provided, discussed and informed consent has been obtained with: patient.    CODE STATUS:

## 2024-06-29 LAB
LAB AP CASE REPORT: NORMAL
PATH REPORT.FINAL DX SPEC: NORMAL
PATH REPORT.GROSS SPEC: NORMAL

## (undated) DEVICE — GLV SURG SENSICARE POLYISPRN W/ALOE PF LF 6.5 GRN STRL

## (undated) DEVICE — SUT SILK B OPTH G1 6/0 18IN 780G BX/12

## (undated) DEVICE — GLV SURG BIOGEL SENSR LTX PF SZ7.5

## (undated) DEVICE — STERILE COTTON TIP 6IN 10PK: Brand: MEDLINE

## (undated) DEVICE — POSITIONER,HEAD,MULTIRING,36CS: Brand: MEDLINE

## (undated) DEVICE — SWABSTK SKINPREP PVPI LF PK/3

## (undated) DEVICE — 4-PORT MANIFOLD: Brand: NEPTUNE 2

## (undated) DEVICE — MARKR SKIN W/RULR ULTRAFINETP

## (undated) DEVICE — TRAP FLD MINIVAC MEGADYNE 100ML

## (undated) DEVICE — SUT MNCRYL 5/0 P3 18 IN Y493G

## (undated) DEVICE — ELECTRD NDL EZ CLN MOD 2.75IN

## (undated) DEVICE — PK ENT 40

## (undated) DEVICE — GLV SURG SENSICARE PI MIC PF SZ6.5 LF STRL

## (undated) DEVICE — SPNG GZ WOVN 4X4IN 12PLY 10/BX STRL

## (undated) DEVICE — DRSNG TELFA PAD NONADH STR 1S 3X4IN

## (undated) DEVICE — BLANKT WARM LOWR/BDY 100X120CM

## (undated) DEVICE — SUT GUT PLN FAST ABS 5/0 PC1 18IN 1915G

## (undated) DEVICE — WIPE INST MEROCEL

## (undated) DEVICE — GLV SURG SENSICARE PI MIC PF SZ7 LF STRL

## (undated) DEVICE — GOWN,SIRUS,NON REINFRCD,LARGE,SET IN SL: Brand: MEDLINE

## (undated) DEVICE — SHLD PR W/SXN/CUP 22X21MM MD ORNG STRL

## (undated) DEVICE — GLV SURG SENSICARE PI PF LF 7 GRN STRL

## (undated) DEVICE — INTENDED FOR TISSUE SEPARATION, AND OTHER PROCEDURES THAT REQUIRE A SHARP SURGICAL BLADE TO PUNCTURE OR CUT.: Brand: BARD-PARKER ® STAINLESS STEEL BLADES

## (undated) DEVICE — NDL HYPO PRECISIONGLIDE REG 25G 1 1/2

## (undated) DEVICE — TRAP FLD MEGADYNE

## (undated) DEVICE — SOL BETADINE 8OZ

## (undated) DEVICE — SOL PREP POVIDONE IODINE

## (undated) DEVICE — GAUZE,SPONGE,4"X4",16PLY,STRL,LF,10/TRAY: Brand: MEDLINE

## (undated) DEVICE — INTENDED FOR TISSUE SEPARATION, AND OTHER PROCEDURES THAT REQUIRE A SHARP SURGICAL BLADE TO PUNCTURE OR CUT.: Brand: BARD-PARKER ® CARBON RIB-BACK BLADES

## (undated) DEVICE — SYR LL TP 10ML STRL

## (undated) DEVICE — THE STERILE LIGHT HANDLE COVER IS USED WITH STERIS SURGICAL LIGHTING AND VISUALIZATION SYSTEMS.

## (undated) DEVICE — GLV SURG BIOGEL M LTX PF 6 1/2

## (undated) DEVICE — SUT MONOCRYL UNDYED 6/0 P3 18IN Y492G

## (undated) DEVICE — SHLD CORNL W/SXN/CUP 21X18MM SM STRL

## (undated) DEVICE — STERILE TOOTHPICK SET: Brand: CENTURION

## (undated) DEVICE — GAUZE,SPONGE,4"X4",16PLY,XRAY,STRL,LF: Brand: MEDLINE

## (undated) DEVICE — WIPE INST 3X3IN 2MM BX/20